# Patient Record
Sex: MALE | Race: WHITE | NOT HISPANIC OR LATINO | Employment: OTHER | URBAN - METROPOLITAN AREA
[De-identification: names, ages, dates, MRNs, and addresses within clinical notes are randomized per-mention and may not be internally consistent; named-entity substitution may affect disease eponyms.]

---

## 2023-04-12 LAB
ALANINE AMINOTRANSFERASE (SGPT) (U/L) IN SER/PLAS: 12 U/L (ref 10–52)
ALBUMIN (G/DL) IN SER/PLAS: 4.3 G/DL (ref 3.4–5)
ALKALINE PHOSPHATASE (U/L) IN SER/PLAS: 43 U/L (ref 33–136)
ANION GAP IN SER/PLAS: 12 MMOL/L (ref 10–20)
ASPARTATE AMINOTRANSFERASE (SGOT) (U/L) IN SER/PLAS: 13 U/L (ref 9–39)
BILIRUBIN TOTAL (MG/DL) IN SER/PLAS: 0.6 MG/DL (ref 0–1.2)
CALCIUM (MG/DL) IN SER/PLAS: 9.6 MG/DL (ref 8.6–10.3)
CARBON DIOXIDE, TOTAL (MMOL/L) IN SER/PLAS: 28 MMOL/L (ref 21–32)
CHLORIDE (MMOL/L) IN SER/PLAS: 104 MMOL/L (ref 98–107)
CHOLESTEROL (MG/DL) IN SER/PLAS: 156 MG/DL (ref 0–199)
CHOLESTEROL IN HDL (MG/DL) IN SER/PLAS: 43 MG/DL
CHOLESTEROL/HDL RATIO: 3.6
CREATININE (MG/DL) IN SER/PLAS: 0.83 MG/DL (ref 0.5–1.3)
GFR MALE: >90 ML/MIN/1.73M2
GLUCOSE (MG/DL) IN SER/PLAS: 115 MG/DL (ref 74–99)
LDL: 93 MG/DL (ref 0–99)
POTASSIUM (MMOL/L) IN SER/PLAS: 4.2 MMOL/L (ref 3.5–5.3)
PROTEIN TOTAL: 7.4 G/DL (ref 6.4–8.2)
SODIUM (MMOL/L) IN SER/PLAS: 140 MMOL/L (ref 136–145)
TRIGLYCERIDE (MG/DL) IN SER/PLAS: 98 MG/DL (ref 0–149)
UREA NITROGEN (MG/DL) IN SER/PLAS: 15 MG/DL (ref 6–23)
VLDL: 20 MG/DL (ref 0–40)

## 2023-09-18 PROBLEM — M17.9 KNEE OSTEOARTHRITIS: Status: ACTIVE | Noted: 2023-09-18

## 2023-09-18 PROBLEM — I49.3 PVC (PREMATURE VENTRICULAR CONTRACTION): Status: ACTIVE | Noted: 2023-09-18

## 2023-09-18 PROBLEM — E55.9 VITAMIN D DEFICIENCY: Status: ACTIVE | Noted: 2023-09-18

## 2023-09-18 PROBLEM — R68.82 DIMINISHED LIBIDO: Status: ACTIVE | Noted: 2023-09-18

## 2023-09-18 PROBLEM — N52.9 ERECTILE DYSFUNCTION: Status: ACTIVE | Noted: 2023-09-18

## 2023-09-18 PROBLEM — I34.0 MITRAL VALVE REGURGITATION: Status: ACTIVE | Noted: 2023-09-18

## 2023-09-18 PROBLEM — F41.9 ANXIETY: Status: ACTIVE | Noted: 2023-09-18

## 2023-09-18 PROBLEM — M19.90 OSTEOARTHRITIS: Status: ACTIVE | Noted: 2023-09-18

## 2023-09-18 PROBLEM — D23.10: Status: ACTIVE | Noted: 2023-09-18

## 2023-09-18 PROBLEM — R09.A2 GLOBUS PHARYNGEUS: Status: ACTIVE | Noted: 2023-09-18

## 2023-09-18 PROBLEM — I36.1 NONRHEUMATIC TRICUSPID VALVE REGURGITATION: Status: ACTIVE | Noted: 2023-09-18

## 2023-09-18 PROBLEM — K04.8: Status: ACTIVE | Noted: 2023-09-18

## 2023-09-18 PROBLEM — E66.9 OBESITY (BMI 30-39.9): Status: ACTIVE | Noted: 2023-09-18

## 2023-09-18 PROBLEM — K58.9 IRRITABLE BOWEL SYNDROME: Status: ACTIVE | Noted: 2023-09-18

## 2023-09-18 PROBLEM — H92.02 LEFT EAR PAIN: Status: ACTIVE | Noted: 2023-09-18

## 2023-09-18 PROBLEM — L25.9 CONTACT DERMATITIS: Status: ACTIVE | Noted: 2023-09-18

## 2023-09-18 PROBLEM — E29.1 HYPOGONADISM MALE: Status: ACTIVE | Noted: 2023-09-18

## 2023-09-18 PROBLEM — G47.30 SLEEP APNEA: Status: ACTIVE | Noted: 2023-09-18

## 2023-09-18 PROBLEM — H69.90 EUSTACHIAN TUBE DYSFUNCTION: Status: ACTIVE | Noted: 2023-09-18

## 2023-09-18 PROBLEM — E78.2 MIXED HYPERLIPIDEMIA: Status: ACTIVE | Noted: 2023-09-18

## 2023-09-18 PROBLEM — D49.2 SKIN NEOPLASM: Status: ACTIVE | Noted: 2023-09-18

## 2023-09-18 PROBLEM — L98.9 SKIN LESION: Status: ACTIVE | Noted: 2023-09-18

## 2023-09-18 PROBLEM — L29.9 ITCHING: Status: ACTIVE | Noted: 2023-09-18

## 2023-09-18 PROBLEM — L23.1 ALLERGIC CONTACT DERMATITIS DUE TO ADHESIVES: Status: ACTIVE | Noted: 2023-09-18

## 2023-09-18 PROBLEM — I10 ESSENTIAL HYPERTENSION: Status: ACTIVE | Noted: 2023-09-18

## 2023-09-18 PROBLEM — Z96.659 S/P TKR (TOTAL KNEE REPLACEMENT): Status: ACTIVE | Noted: 2023-09-18

## 2023-09-18 PROBLEM — M62.830 PARASPINAL MUSCLE SPASM: Status: ACTIVE | Noted: 2023-09-18

## 2023-09-18 PROBLEM — M18.10 ARTHRITIS OF CARPOMETACARPAL (CMC) JOINT OF THUMB: Status: ACTIVE | Noted: 2023-09-18

## 2023-09-18 PROBLEM — R42 DIZZINESS: Status: ACTIVE | Noted: 2023-09-18

## 2023-09-18 PROBLEM — M79.673 FOOT PAIN: Status: ACTIVE | Noted: 2023-09-18

## 2023-09-18 PROBLEM — I65.29 CAROTID ARTERY STENOSIS: Status: ACTIVE | Noted: 2023-09-18

## 2023-09-18 PROBLEM — I48.91 ATRIAL FIBRILLATION (MULTI): Status: ACTIVE | Noted: 2023-09-18

## 2023-09-18 RX ORDER — EPINEPHRINE 0.22MG
100 AEROSOL WITH ADAPTER (ML) INHALATION DAILY
COMMUNITY

## 2023-09-18 RX ORDER — ATENOLOL 25 MG/1
25 TABLET ORAL DAILY
COMMUNITY
Start: 2023-04-10

## 2023-09-18 RX ORDER — LATANOPROST 50 UG/ML
1 SOLUTION/ DROPS OPHTHALMIC
COMMUNITY

## 2023-09-18 RX ORDER — LOSARTAN POTASSIUM 100 MG/1
1 TABLET ORAL DAILY
COMMUNITY

## 2023-09-18 RX ORDER — ACETAMINOPHEN 500 MG
1 TABLET ORAL DAILY
COMMUNITY

## 2023-09-18 RX ORDER — OMEPRAZOLE 40 MG/1
40 CAPSULE, DELAYED RELEASE ORAL DAILY
COMMUNITY
End: 2023-12-11 | Stop reason: ALTCHOICE

## 2023-09-18 RX ORDER — CLOTRIMAZOLE AND BETAMETHASONE DIPROPIONATE 10; .64 MG/G; MG/G
1 CREAM TOPICAL SEE ADMIN INSTRUCTIONS
COMMUNITY

## 2023-09-18 RX ORDER — SILDENAFIL 100 MG/1
100 TABLET, FILM COATED ORAL AS NEEDED
COMMUNITY

## 2023-11-06 ENCOUNTER — OFFICE VISIT (OUTPATIENT)
Dept: CARDIOLOGY | Facility: CLINIC | Age: 73
End: 2023-11-06
Payer: MEDICARE

## 2023-11-06 VITALS
BODY MASS INDEX: 36.52 KG/M2 | DIASTOLIC BLOOD PRESSURE: 78 MMHG | SYSTOLIC BLOOD PRESSURE: 124 MMHG | HEART RATE: 60 BPM | WEIGHT: 269.6 LBS | HEIGHT: 72 IN

## 2023-11-06 DIAGNOSIS — I10 ESSENTIAL HYPERTENSION: ICD-10-CM

## 2023-11-06 DIAGNOSIS — I36.1 NONRHEUMATIC TRICUSPID VALVE REGURGITATION: ICD-10-CM

## 2023-11-06 DIAGNOSIS — I65.23 BILATERAL CAROTID ARTERY STENOSIS: ICD-10-CM

## 2023-11-06 DIAGNOSIS — I48.0 PAROXYSMAL ATRIAL FIBRILLATION (MULTI): ICD-10-CM

## 2023-11-06 DIAGNOSIS — Z87.891 FORMER SMOKER: ICD-10-CM

## 2023-11-06 DIAGNOSIS — I34.0 NONRHEUMATIC MITRAL VALVE REGURGITATION: ICD-10-CM

## 2023-11-06 DIAGNOSIS — E78.2 MIXED HYPERLIPIDEMIA: ICD-10-CM

## 2023-11-06 PROCEDURE — 1036F TOBACCO NON-USER: CPT | Performed by: INTERNAL MEDICINE

## 2023-11-06 PROCEDURE — 3078F DIAST BP <80 MM HG: CPT | Performed by: INTERNAL MEDICINE

## 2023-11-06 PROCEDURE — 99214 OFFICE O/P EST MOD 30 MIN: CPT | Performed by: INTERNAL MEDICINE

## 2023-11-06 PROCEDURE — 3008F BODY MASS INDEX DOCD: CPT | Performed by: INTERNAL MEDICINE

## 2023-11-06 PROCEDURE — 1159F MED LIST DOCD IN RCRD: CPT | Performed by: INTERNAL MEDICINE

## 2023-11-06 PROCEDURE — 3074F SYST BP LT 130 MM HG: CPT | Performed by: INTERNAL MEDICINE

## 2023-11-06 ASSESSMENT — ENCOUNTER SYMPTOMS
CONSTITUTIONAL NEGATIVE: 1
RESPIRATORY NEGATIVE: 1
CARDIOVASCULAR NEGATIVE: 1
NEUROLOGICAL NEGATIVE: 1

## 2023-11-06 NOTE — PROGRESS NOTES
CARDIOLOGY OFFICE VISIT      CHIEF COMPLAINT  Chief Complaint   Patient presents with    Follow-up     6 month        HISTORY OF PRESENT ILLNESS  Venancio Cervantes is a 73 y.o. year old male patient with a history of hypertension, paroxysmal A-fib s/p cardioversion in .  He has been doing well and had a repeat echo in 2023 that shows low normal LV function with EF of 50% and mild mitral and tricuspid regurgitation.  He denies any chest pain or shortness of breath with his day-to-day activities.  He has not had any recurrent A-fib since then.  Labs from 2023 shows cholesterol is 156, HDL is 43, LDL is 93 and triglyceride is 98    ASSESSMENT AND PLAN  1.  Paroxysmal A-fib: He continues to maintain sinus rhythm on his current medications, will continue Eliquis for anticoagulation.  2.  Hypertension: Blood pressures well controlled on current medications which we will continue.  3.  Overweight: Is encouraged to lose weight at least 10 pounds prior to his next follow-up.    Problem List Items Addressed This Visit       Essential hypertension    Carotid artery stenosis    Atrial fibrillation (CMS/HCC)    Nonrheumatic tricuspid valve regurgitation    Mitral valve regurgitation    Mixed hyperlipidemia    BMI 36.0-36.9,adult    Former smoker       Recent Cardiovascular Testing:    Echo-  Stress-  Cath-  Carotid Ultrasound-    Past Medical History  Past Medical History:   Diagnosis Date    Body mass index (BMI) 37.0-37.9, adult     BMI 37.0-37.9, adult    Rash and other nonspecific skin eruption 2021    Rash       Social History  Social History     Tobacco Use    Smoking status: Former     Packs/day: 1.00     Years: 15.00     Additional pack years: 0.00     Total pack years: 15.00     Types: Cigarettes     Quit date:      Years since quittin.8    Smokeless tobacco: Never   Substance Use Topics    Alcohol use: Yes     Comment: 1x weekly    Drug use: Never       Family History     Family History  "  Problem Relation Name Age of Onset    Irregular heart beat Mother      Other (arteriosclerotic cardiovascular disease) Sister      Lung cancer Sister      Colon cancer Brother          Allergies:  Allergies   Allergen Reactions    Doxycycline Other    Metoprolol Unknown    Silver Sulfadiazine Rash        Outpatient Medications:  Current Outpatient Medications   Medication Instructions    apixaban (ELIQUIS) 5 mg, oral, 2 times daily    atenolol (TENORMIN) 25 mg, oral, Daily    cholecalciferol (Vitamin D-3) 5,000 Units tablet 1 tablet, oral, Daily    clotrimazole-betamethasone (Lotrisone) cream 1 Application, Topical, See admin instructions, Apply as directed by physician    coenzyme Q-10 100 mg, oral, Daily    latanoprost (Xalatan) 0.005 % ophthalmic solution 1 drop, ophthalmic (eye), Daily RT    losartan (Cozaar) 100 mg tablet 1 tablet, oral, Daily    omeprazole (PRILOSEC) 40 mg, oral, Daily    sildenafil (VIAGRA) 100 mg, oral, As needed        Recent Lab Results:    CBC:   No results found for: \"WBC\", \"RBC\", \"HGB\", \"HCT\", \"PLT\"     CMP:    Lab Results   Component Value Date     04/12/2023    K 4.2 04/12/2023     04/12/2023    CO2 28 04/12/2023    BUN 15 04/12/2023    CREATININE 0.83 04/12/2023    GLUCOSE 115 (H) 04/12/2023    CALCIUM 9.6 04/12/2023       Magnesium:    No results found for: \"MG\"    Lipid Profile:    Lab Results   Component Value Date    TRIG 98 04/12/2023    HDL 43.0 04/12/2023       TSH:    Lab Results   Component Value Date    TSH 1.83 07/03/2019       BNP:   No results found for: \"BNP\"     PT/INR:    No results found for: \"PROTIME\", \"INR\"    HgBA1c:    Lab Results   Component Value Date    HGBA1C 5.5 07/03/2019       BMP:  Lab Results   Component Value Date     04/12/2023     07/03/2019    K 4.2 04/12/2023    K 4.5 07/03/2019     04/12/2023     07/03/2019    CO2 28 04/12/2023    CO2 27 07/03/2019    BUN 15 04/12/2023    BUN 16 07/03/2019    CREATININE 0.83 " "04/12/2023    CREATININE 0.86 07/03/2019       CBC:  No results found for: \"WBC\", \"RBC\", \"HGB\", \"HCT\", \"MCV\", \"MCH\", \"MCHC\", \"RDW\", \"PLT\", \"MPV\"    Cardiac Enzymes:    No results found for: \"TROPHS\"    Hepatic Function Panel:    Lab Results   Component Value Date    ALKPHOS 43 04/12/2023    ALT 12 04/12/2023    AST 13 04/12/2023    PROT 7.4 04/12/2023    BILITOT 0.6 04/12/2023         REVIEW OF SYSTEMS  Review of Systems   Constitutional: Negative.   Cardiovascular: Negative.    Respiratory: Negative.     Neurological: Negative.    All other systems reviewed and are negative.      VITALS  Vitals:    11/06/23 0921   BP: 124/78   Pulse: 60     Wt Readings from Last 4 Encounters:   11/06/23 122 kg (269 lb 9.6 oz)   05/03/23 125 kg (275 lb)   04/10/23 124 kg (273 lb 4 oz)   12/14/21 117 kg (259 lb)       PHYSICAL EXAM  Constitutional:       Appearance: Healthy appearance.   Eyes:      Pupils: Pupils are equal, round, and reactive to light.   Pulmonary:      Effort: Pulmonary effort is normal.      Breath sounds: Normal breath sounds.   Cardiovascular:      PMI at left midclavicular line. Normal rate. Regular rhythm.      Murmurs: There is no murmur.      No gallop.  No click. No rub.   Pulses:     Intact distal pulses.   Musculoskeletal: Normal range of motion.      Cervical back: Normal range of motion. Skin:     General: Skin is warm and dry.   Neurological:      General: No focal deficit present.      Mental Status: Alert and oriented to person, place and time.             "

## 2023-12-11 ENCOUNTER — OFFICE VISIT (OUTPATIENT)
Dept: PRIMARY CARE | Facility: CLINIC | Age: 73
End: 2023-12-11
Payer: MEDICARE

## 2023-12-11 VITALS
DIASTOLIC BLOOD PRESSURE: 82 MMHG | TEMPERATURE: 97.9 F | WEIGHT: 271 LBS | SYSTOLIC BLOOD PRESSURE: 126 MMHG | HEART RATE: 67 BPM | BODY MASS INDEX: 34.78 KG/M2 | HEIGHT: 74 IN

## 2023-12-11 DIAGNOSIS — R05.1 ACUTE COUGH: ICD-10-CM

## 2023-12-11 DIAGNOSIS — J01.00 ACUTE NON-RECURRENT MAXILLARY SINUSITIS: Primary | ICD-10-CM

## 2023-12-11 DIAGNOSIS — J02.9 SORE THROAT: ICD-10-CM

## 2023-12-11 DIAGNOSIS — I48.0 PAF (PAROXYSMAL ATRIAL FIBRILLATION) (MULTI): ICD-10-CM

## 2023-12-11 PROCEDURE — 1126F AMNT PAIN NOTED NONE PRSNT: CPT | Performed by: INTERNAL MEDICINE

## 2023-12-11 PROCEDURE — 3074F SYST BP LT 130 MM HG: CPT | Performed by: INTERNAL MEDICINE

## 2023-12-11 PROCEDURE — 3008F BODY MASS INDEX DOCD: CPT | Performed by: INTERNAL MEDICINE

## 2023-12-11 PROCEDURE — 1159F MED LIST DOCD IN RCRD: CPT | Performed by: INTERNAL MEDICINE

## 2023-12-11 PROCEDURE — 3079F DIAST BP 80-89 MM HG: CPT | Performed by: INTERNAL MEDICINE

## 2023-12-11 PROCEDURE — 99213 OFFICE O/P EST LOW 20 MIN: CPT | Performed by: INTERNAL MEDICINE

## 2023-12-11 PROCEDURE — 1036F TOBACCO NON-USER: CPT | Performed by: INTERNAL MEDICINE

## 2023-12-11 RX ORDER — AMOXICILLIN 500 MG/1
500 CAPSULE ORAL 3 TIMES DAILY
Qty: 30 CAPSULE | Refills: 0 | Status: SHIPPED | OUTPATIENT
Start: 2023-12-11 | End: 2023-12-21

## 2023-12-11 ASSESSMENT — PAIN SCALES - GENERAL: PAINLEVEL: 0-NO PAIN

## 2023-12-11 NOTE — PROGRESS NOTES
Patient is seen my office today with chief complaint of cough congestion nasal drainage and pressure in the facial area for about a week.  He is worried about a streptococcal infection because one of the family members had this infection recently home patient was babysitting.  He has no fever or chills.  Denies any shortness of breath or wheezing.  Has no chest pain or palpitation.  Patient has a history of paroxysmal atrial fibrillation.  He is anticoagulated.  Review of other systems are negative.    On examination:  General examination: No acute discomfort  Eyes: There is no pallor or jaundice  Nose: Nasal mucosa is congested  Oral cavity throat: Pharyngeal wall congestion is present.  Postnasal discharge is present.  Neck: There is no lymphadenopathy or JVD  Lungs: Clear to auscultation  CVS: Heart sounds are regular.  There is no murmur    Assessment and plan  1.  Acute maxillary sinusitis: I am going to prescribe amoxicillin.  2.  Cough: Advised to take cough medications as needed  3.  Contact with a Streptococcus sore throat patient.  The office test for respiratory course is negative  4.  History of paroxysmal atrial fibrillation: Patient is on apixaban.  Interaction with antibiotics is checked.  Patient is advised to call our office if he is not feeling better after 2 or 3 days.

## 2024-01-04 ENCOUNTER — TELEPHONE (OUTPATIENT)
Dept: PRIMARY CARE | Facility: CLINIC | Age: 74
End: 2024-01-04
Payer: MEDICARE

## 2024-01-04 DIAGNOSIS — Z12.11 ENCOUNTER FOR SCREENING COLONOSCOPY: ICD-10-CM

## 2024-08-15 ENCOUNTER — OFFICE VISIT (OUTPATIENT)
Dept: CARDIOLOGY | Facility: CLINIC | Age: 74
End: 2024-08-15
Payer: COMMERCIAL

## 2024-08-15 ENCOUNTER — LAB (OUTPATIENT)
Dept: LAB | Facility: LAB | Age: 74
End: 2024-08-15
Payer: MEDICARE

## 2024-08-15 VITALS
DIASTOLIC BLOOD PRESSURE: 70 MMHG | BODY MASS INDEX: 34.39 KG/M2 | WEIGHT: 268 LBS | SYSTOLIC BLOOD PRESSURE: 116 MMHG | HEART RATE: 99 BPM | HEIGHT: 74 IN

## 2024-08-15 DIAGNOSIS — Z87.891 FORMER SMOKER: ICD-10-CM

## 2024-08-15 DIAGNOSIS — I10 ESSENTIAL HYPERTENSION: ICD-10-CM

## 2024-08-15 DIAGNOSIS — I49.3 PVC (PREMATURE VENTRICULAR CONTRACTION): ICD-10-CM

## 2024-08-15 DIAGNOSIS — E66.9 OBESITY (BMI 30-39.9): ICD-10-CM

## 2024-08-15 DIAGNOSIS — I36.1 NONRHEUMATIC TRICUSPID VALVE REGURGITATION: ICD-10-CM

## 2024-08-15 DIAGNOSIS — I48.0 PAROXYSMAL ATRIAL FIBRILLATION (MULTI): ICD-10-CM

## 2024-08-15 DIAGNOSIS — E78.2 MIXED HYPERLIPIDEMIA: ICD-10-CM

## 2024-08-15 DIAGNOSIS — I34.0 MITRAL VALVE INSUFFICIENCY, UNSPECIFIED ETIOLOGY: ICD-10-CM

## 2024-08-15 LAB
ANION GAP SERPL CALC-SCNC: 10 MMOL/L (ref 10–20)
BUN SERPL-MCNC: 18 MG/DL (ref 6–23)
CALCIUM SERPL-MCNC: 9.8 MG/DL (ref 8.6–10.3)
CHLORIDE SERPL-SCNC: 103 MMOL/L (ref 98–107)
CO2 SERPL-SCNC: 31 MMOL/L (ref 21–32)
CREAT SERPL-MCNC: 1.09 MG/DL (ref 0.5–1.3)
EGFRCR SERPLBLD CKD-EPI 2021: 71 ML/MIN/1.73M*2
GLUCOSE SERPL-MCNC: 107 MG/DL (ref 74–99)
POTASSIUM SERPL-SCNC: 4.3 MMOL/L (ref 3.5–5.3)
SODIUM SERPL-SCNC: 140 MMOL/L (ref 136–145)

## 2024-08-15 PROCEDURE — 3074F SYST BP LT 130 MM HG: CPT | Performed by: INTERNAL MEDICINE

## 2024-08-15 PROCEDURE — 3078F DIAST BP <80 MM HG: CPT | Performed by: INTERNAL MEDICINE

## 2024-08-15 PROCEDURE — 1036F TOBACCO NON-USER: CPT | Performed by: INTERNAL MEDICINE

## 2024-08-15 PROCEDURE — 80048 BASIC METABOLIC PNL TOTAL CA: CPT

## 2024-08-15 PROCEDURE — 1159F MED LIST DOCD IN RCRD: CPT | Performed by: INTERNAL MEDICINE

## 2024-08-15 PROCEDURE — 99214 OFFICE O/P EST MOD 30 MIN: CPT | Performed by: INTERNAL MEDICINE

## 2024-08-15 PROCEDURE — 93000 ELECTROCARDIOGRAM COMPLETE: CPT | Performed by: INTERNAL MEDICINE

## 2024-08-15 RX ORDER — ATENOLOL 25 MG/1
50 TABLET ORAL DAILY
Start: 2024-08-15 | End: 2025-08-15

## 2024-08-15 RX ORDER — LANOLIN ALCOHOL/MO/W.PET/CERES
400 CREAM (GRAM) TOPICAL DAILY
Start: 2024-08-15 | End: 2025-08-15

## 2024-08-15 NOTE — PROGRESS NOTES
CARDIOLOGY OFFICE VISIT      CHIEF COMPLAINT  Atrial fibrillation    HISTORY OF PRESENT ILLNESS  The patient states that he about a week ago he went into atrial fibrillation.  He does have [employer] mobile device and checked it and he was in atrial fibrillation.  He has noticed more fatigue and dyspnea activities.  He had a funny feeling in his throat at times.  He denies chest discomfort or symptoms of myocardial ischemia.  He denies presyncope and syncope.  He states she was on vacation last week and forgot his medicines for 1 day.  I told him I would recommend that he undergo RAKAN guided cardioversion since he has been off his Eliquis for 24 hours recently.  He understands and is agreeable.    EKG: Atrial fibrillation with a rapid ventricular spots, nonspecific ST-T changes, results discussed with patient    Impression:  Paroxysmal atrial fibrillation  Hypertension  Mitral regurgitation  Tricuspid regurgitation  Former smoker  Obstructive sleep apnea being managed with CPAP  Obesity    Plan:  Start magnesium oxide 400 mg a day with food, increase atenolol to 50 mg a day  RAKAN guided cardioversion    Please excuse any errors in grammar or translation related to this dictation.  Voice recognition software was utilized to prepare this document.    Past Medical History      Social History  Social History     Tobacco Use    Smoking status: Former     Current packs/day: 0.00     Average packs/day: 1 pack/day for 15.0 years (15.0 ttl pk-yrs)     Types: Cigarettes     Start date:      Quit date:      Years since quittin.6     Passive exposure: Past    Smokeless tobacco: Never   Substance Use Topics    Alcohol use: Not Currently     Comment: 1x weekly    Drug use: Never       Family History     Family History   Problem Relation Name Age of Onset    Irregular heart beat Mother      Other (arteriosclerotic cardiovascular disease) Sister      Lung cancer Sister      Colon cancer Brother          Allergies:  Allergies    Allergen Reactions    Doxycycline Other    Metoprolol Unknown    Silver Sulfadiazine Rash        Outpatient Medications:  Current Outpatient Medications   Medication Instructions    apixaban (ELIQUIS) 5 mg, oral, 2 times daily    atenolol (TENORMIN) 25 mg, oral, Daily    clotrimazole-betamethasone (Lotrisone) cream 1 Application, Topical, See admin instructions, Apply as directed by physician    coenzyme Q-10 100 mg, oral, Daily    latanoprost (Xalatan) 0.005 % ophthalmic solution 1 drop, ophthalmic (eye), Daily RT    losartan (Cozaar) 100 mg tablet 1 tablet, oral, Daily    sildenafil (VIAGRA) 100 mg, oral, As needed          REVIEW OF SYSTEMS  Review of Systems   All other systems reviewed and are negative.        VITALS  Vitals:    08/15/24 1301   BP: 116/70   Pulse: 99       PHYSICAL EXAM  Constitutional:       Appearance: Healthy appearance. Not in distress.   Eyes:      Conjunctiva/sclera: Conjunctivae normal.      Pupils: Pupils are equal, round, and reactive to light.   Neck:      Vascular: No JVR. JVD normal.   Pulmonary:      Effort: Pulmonary effort is normal.      Breath sounds: Normal breath sounds. No wheezing. No rhonchi. No rales.   Chest:      Chest wall: Not tender to palpatation.   Cardiovascular:      PMI at left midclavicular line. Normal rate. Irregularly irregular rhythm. Normal S1. Normal S2.       Murmurs: There is no murmur.      No gallop.  No click. No rub.   Pulses:     Intact distal pulses.   Edema:     Peripheral edema absent.   Abdominal:      Tenderness: There is no abdominal tenderness.   Musculoskeletal: Normal range of motion.         General: No tenderness.      Cervical back: Normal range of motion. Skin:     General: Skin is warm and dry.   Neurological:      General: No focal deficit present.      Mental Status: Alert and oriented to person, place and time.           ASSESSMENT AND PLAN  Diagnoses and all orders for this visit:  Paroxysmal atrial fibrillation  (Multi)  Essential hypertension  Mixed hyperlipidemia  Mitral valve insufficiency, unspecified etiology  Nonrheumatic tricuspid valve regurgitation  PVC (premature ventricular contraction)  Obesity (BMI 30-39.9)  Former smoker      [unfilled]

## 2024-08-15 NOTE — PATIENT INSTRUCTIONS
Increase your Atenolol to 2 tablets once a day  START Mag-ox  400 mg once a day  You will be scheduled for a RAKAN Cardioversion with Dr. Santos    Continue same medications/treatment.  Patient educated on proper medication use.  Patient educated on risk factor modification.  Please bring any lab results from other providers / physicians to your next appointment.    Please bring all medicines, vitamins and herbal supplements with you when you come to the office.    Prescriptions will not be filled unless you are compliant with your follow up appointments or have a follow up  appointment scheduled as per instruction of your physician.  Refills should be requested at the time of  Your visit.    Audrey HADLEY LPN, am scribing for and in the presence of Dr. Inocencio Mratinez MD, FACC

## 2024-08-16 ENCOUNTER — TELEPHONE (OUTPATIENT)
Dept: CARDIOLOGY | Facility: CLINIC | Age: 74
End: 2024-08-16
Payer: MEDICARE

## 2024-08-16 NOTE — TELEPHONE ENCOUNTER
Pt verbalized understanding of the results that were read and would like a copy of labs when he is seen again for [facility]

## 2024-08-16 NOTE — TELEPHONE ENCOUNTER
----- Message from Nurse Audrey PADRON sent at 8/16/2024  9:49 AM EDT -----    ----- Message -----  From: Inocencio Martinez MD  Sent: 8/16/2024   7:55 AM EDT  To: Audrey Bowman LPN    Renal profile okay  ----- Message -----  From: Lab, Background User  Sent: 8/15/2024   5:12 PM EDT  To: Inocencio Martinez MD

## 2024-08-19 DIAGNOSIS — I10 ESSENTIAL HYPERTENSION: ICD-10-CM

## 2024-08-19 NOTE — TELEPHONE ENCOUNTER
Patient called and adv that he went to his primary care today and his blood pressure was 90/60 and his primary care suggested to contact Dr. FERNANDEZ about it. Patient wants to know if his Losartan should be cut in half since his blood pressure is 90/60 or should it remain the same? I advised pt that a nurse will give him a call for further instructions.

## 2024-08-20 RX ORDER — LOSARTAN POTASSIUM 100 MG/1
50 TABLET ORAL DAILY
Start: 2024-08-20 | End: 2025-08-20

## 2024-08-20 NOTE — TELEPHONE ENCOUNTER
Per Dr. Inocencio Martinez , have patient decrease his Losartan to 50 mg daily, may cut current 100 mg tablet in half and take 1/2 tablet daily.  Call placed to patient and advised.  Patient verbalized understanding.

## 2024-08-22 ENCOUNTER — HOSPITAL ENCOUNTER (OUTPATIENT)
Dept: CARDIOLOGY | Facility: HOSPITAL | Age: 74
Discharge: HOME | End: 2024-08-22
Payer: COMMERCIAL

## 2024-08-22 ENCOUNTER — ANESTHESIA (OUTPATIENT)
Dept: CARDIOLOGY | Facility: HOSPITAL | Age: 74
End: 2024-08-22
Payer: COMMERCIAL

## 2024-08-22 ENCOUNTER — ANESTHESIA EVENT (OUTPATIENT)
Dept: CARDIOLOGY | Facility: HOSPITAL | Age: 74
End: 2024-08-22
Payer: COMMERCIAL

## 2024-08-22 DIAGNOSIS — I48.0 PAROXYSMAL ATRIAL FIBRILLATION (MULTI): ICD-10-CM

## 2024-08-22 DIAGNOSIS — I48.19 OTHER PERSISTENT ATRIAL FIBRILLATION (MULTI): ICD-10-CM

## 2024-08-22 LAB
APTT PPP: 34 SECONDS (ref 27–38)
ATRIAL RATE: 133 BPM
ATRIAL RATE: 375 BPM
EJECTION FRACTION: 60 %
ERYTHROCYTE [DISTWIDTH] IN BLOOD BY AUTOMATED COUNT: 13.2 % (ref 11.5–14.5)
HCT VFR BLD AUTO: 49.7 % (ref 41–52)
HGB BLD-MCNC: 16.9 G/DL (ref 13.5–17.5)
INR PPP: 1.5 (ref 0.9–1.1)
MCH RBC QN AUTO: 30.7 PG (ref 26–34)
MCHC RBC AUTO-ENTMCNC: 34 G/DL (ref 32–36)
MCV RBC AUTO: 90 FL (ref 80–100)
NRBC BLD-RTO: 0 /100 WBCS (ref 0–0)
PLATELET # BLD AUTO: 248 X10*3/UL (ref 150–450)
PROTHROMBIN TIME: 16.4 SECONDS (ref 9.8–12.8)
Q ONSET: 219 MS
Q ONSET: 221 MS
QRS COUNT: 13 BEATS
QRS COUNT: 14 BEATS
QRS DURATION: 102 MS
QRS DURATION: 94 MS
QT INTERVAL: 354 MS
QT INTERVAL: 372 MS
QTC CALCULATION(BAZETT): 425 MS
QTC CALCULATION(BAZETT): 432 MS
QTC FREDERICIA: 400 MS
QTC FREDERICIA: 411 MS
R AXIS: 1 DEGREES
R AXIS: 6 DEGREES
RBC # BLD AUTO: 5.5 X10*6/UL (ref 4.5–5.9)
RIGHT VENTRICLE PEAK SYSTOLIC PRESSURE: 18.1 MMHG
T AXIS: 1 DEGREES
T AXIS: 15 DEGREES
T OFFSET: 396 MS
T OFFSET: 407 MS
VENTRICULAR RATE: 81 BPM
VENTRICULAR RATE: 87 BPM
WBC # BLD AUTO: 8 X10*3/UL (ref 4.4–11.3)

## 2024-08-22 PROCEDURE — 85027 COMPLETE CBC AUTOMATED: CPT | Performed by: NURSE PRACTITIONER

## 2024-08-22 PROCEDURE — 7100000009 HC PHASE TWO TIME - INITIAL BASE CHARGE

## 2024-08-22 PROCEDURE — 2500000004 HC RX 250 GENERAL PHARMACY W/ HCPCS (ALT 636 FOR OP/ED): Performed by: NURSE PRACTITIONER

## 2024-08-22 PROCEDURE — 92960 CARDIOVERSION ELECTRIC EXT: CPT | Performed by: INTERNAL MEDICINE

## 2024-08-22 PROCEDURE — 3700000001 HC GENERAL ANESTHESIA TIME - INITIAL BASE CHARGE

## 2024-08-22 PROCEDURE — 2500000004 HC RX 250 GENERAL PHARMACY W/ HCPCS (ALT 636 FOR OP/ED): Performed by: NURSE ANESTHETIST, CERTIFIED REGISTERED

## 2024-08-22 PROCEDURE — 93005 ELECTROCARDIOGRAM TRACING: CPT

## 2024-08-22 PROCEDURE — 93312 ECHO TRANSESOPHAGEAL: CPT | Performed by: INTERNAL MEDICINE

## 2024-08-22 PROCEDURE — 7100000010 HC PHASE TWO TIME - EACH INCREMENTAL 1 MINUTE

## 2024-08-22 PROCEDURE — 2500000005 HC RX 250 GENERAL PHARMACY W/O HCPCS: Performed by: INTERNAL MEDICINE

## 2024-08-22 PROCEDURE — 3700000002 HC GENERAL ANESTHESIA TIME - EACH INCREMENTAL 1 MINUTE

## 2024-08-22 PROCEDURE — 85610 PROTHROMBIN TIME: CPT | Performed by: NURSE PRACTITIONER

## 2024-08-22 PROCEDURE — 99152 MOD SED SAME PHYS/QHP 5/>YRS: CPT

## 2024-08-22 PROCEDURE — 99153 MOD SED SAME PHYS/QHP EA: CPT

## 2024-08-22 PROCEDURE — 36415 COLL VENOUS BLD VENIPUNCTURE: CPT | Performed by: NURSE PRACTITIONER

## 2024-08-22 PROCEDURE — 99153 MOD SED SAME PHYS/QHP EA: CPT | Performed by: INTERNAL MEDICINE

## 2024-08-22 PROCEDURE — 99152 MOD SED SAME PHYS/QHP 5/>YRS: CPT | Performed by: INTERNAL MEDICINE

## 2024-08-22 PROCEDURE — 2500000004 HC RX 250 GENERAL PHARMACY W/ HCPCS (ALT 636 FOR OP/ED): Performed by: INTERNAL MEDICINE

## 2024-08-22 PROCEDURE — 93325 DOPPLER ECHO COLOR FLOW MAPG: CPT | Performed by: INTERNAL MEDICINE

## 2024-08-22 PROCEDURE — 93320 DOPPLER ECHO COMPLETE: CPT | Performed by: INTERNAL MEDICINE

## 2024-08-22 PROCEDURE — 93320 DOPPLER ECHO COMPLETE: CPT

## 2024-08-22 RX ORDER — FENTANYL CITRATE 50 UG/ML
INJECTION, SOLUTION INTRAMUSCULAR; INTRAVENOUS AS NEEDED
Status: DISCONTINUED | OUTPATIENT
Start: 2024-08-22 | End: 2024-08-22 | Stop reason: HOSPADM

## 2024-08-22 RX ORDER — SODIUM CHLORIDE 9 MG/ML
20 INJECTION, SOLUTION INTRAVENOUS CONTINUOUS
Status: DISCONTINUED | OUTPATIENT
Start: 2024-08-22 | End: 2024-08-22

## 2024-08-22 RX ORDER — MIDAZOLAM HYDROCHLORIDE 1 MG/ML
INJECTION, SOLUTION INTRAMUSCULAR; INTRAVENOUS AS NEEDED
Status: DISCONTINUED | OUTPATIENT
Start: 2024-08-22 | End: 2024-08-22 | Stop reason: HOSPADM

## 2024-08-22 RX ORDER — LIDOCAINE HYDROCHLORIDE 20 MG/ML
JELLY TOPICAL AS NEEDED
Status: DISCONTINUED | OUTPATIENT
Start: 2024-08-22 | End: 2024-08-22 | Stop reason: HOSPADM

## 2024-08-22 RX ORDER — PROPOFOL 10 MG/ML
INJECTION, EMULSION INTRAVENOUS AS NEEDED
Status: DISCONTINUED | OUTPATIENT
Start: 2024-08-22 | End: 2024-08-22

## 2024-08-22 ASSESSMENT — COLUMBIA-SUICIDE SEVERITY RATING SCALE - C-SSRS
6. HAVE YOU EVER DONE ANYTHING, STARTED TO DO ANYTHING, OR PREPARED TO DO ANYTHING TO END YOUR LIFE?: NO
1. IN THE PAST MONTH, HAVE YOU WISHED YOU WERE DEAD OR WISHED YOU COULD GO TO SLEEP AND NOT WAKE UP?: NO
2. HAVE YOU ACTUALLY HAD ANY THOUGHTS OF KILLING YOURSELF?: NO

## 2024-08-22 ASSESSMENT — PAIN SCALES - GENERAL
PAINLEVEL_OUTOF10: 0 - NO PAIN

## 2024-08-22 ASSESSMENT — PAIN - FUNCTIONAL ASSESSMENT
PAIN_FUNCTIONAL_ASSESSMENT: 0-10

## 2024-08-22 NOTE — SIGNIFICANT EVENT
Upon arrival to room focused assessment performed and WDL. Pt talking with RN, denies dizziness/lightheadedness/pain. Educated Pt on current restrictions and that he cannot eat or drink unit bedside swallow evaluation has been performed and gag reflex has returned. He states understanding. Placed Pt on 3L O2 via NC, wife at bedside.

## 2024-08-22 NOTE — PRE-PROCEDURE ASSESSMENT
[facility]-NCDR CathPCI V5 Collection Form    Pre-Procedure Information  - Electrocardiac assessment method: telemetry monitor     - The assessment result was abnormal.      - Abnormal assessment findings include: new onset atrial fibrillation    Indications and Presentation  - Indication(s) for cath lab visit: cardiac arrhythmia    - Ventricular support was not required.           74-year-old developed atrial fibrillation earlier this month based on home Kardia device.  No symptoms of CHF or angina.  Past history hypertension, mitral insufficiency, sleep apnea on CPAP.  Minimal palpitations primary symptom fatigue and dyspnea.  In light of symptomatic and recent onset initial approach RAKAN cardioversion.

## 2024-08-22 NOTE — SIGNIFICANT EVENT
"Discharge instructions given via \"teach back: method. Covered: Post RAKAN/DCC discharge instructions/education, medications/when to resume them, and follow up appointments. Pt and family state understanding and answer follow up questions correctly. Ready to discharge home via wheelchair.  "

## 2024-08-22 NOTE — ANESTHESIA POSTPROCEDURE EVALUATION
Patient: Venancio Cervantes    Procedure Summary       Date: 08/22/24 [facility]    Anesthesia Start: 1018 Anesthesia Stop: 1026    Procedure: TRANSESOPHAGEAL ECHO (RAKAN) W/ POSSIBLE CARDIOVERSION Diagnosis:       Paroxysmal atrial fibrillation (Multi)      (paroxysmal atrial fibrillation)    Scheduled Providers: Gallo Arvizu MD Responsible Provider: Leighann Styles MD    Anesthesia Type: MAC ASA Status: 3            Anesthesia Type: MAC    Anesthesia Post Evaluation    Patient location during evaluation: bedside  Patient participation: complete - patient participated  Level of consciousness: sleepy but conscious  Pain management: adequate  Airway patency: patent  Cardiovascular status: acceptable  Respiratory status: acceptable  Hydration status: acceptable  Postoperative Nausea and Vomiting: none      No notable events documented.

## 2024-08-22 NOTE — PRE-SEDATION DOCUMENTATION
Sedation Plan    ASA 3     Mallampati class: I.    Risks, benefits, and alternatives discussed with patient.    Procedure to be RAKAN/cardioversion for symptomatic atrial fibrillation

## 2024-08-22 NOTE — ANESTHESIA PREPROCEDURE EVALUATION
Patient: Venancio Cervantes    Procedure Information       Date/Time: 08/22/24 1000    Scheduled providers: Gallo Arvizu MD    Procedure: TRANSESOPHAGEAL ECHO (RAKAN) W/ POSSIBLE CARDIOVERSION    [facility]            Relevant Problems   Cardiac   (+) Atrial fibrillation (Multi)   (+) Essential hypertension   (+) Mitral valve regurgitation   (+) Mixed hyperlipidemia   (+) Nonrheumatic tricuspid valve regurgitation   (+) PVC (premature ventricular contraction)      Neuro   (+) Anxiety   (+) Carotid artery stenosis      GI   (+) Irritable bowel syndrome      Musculoskeletal   (+) Knee osteoarthritis   (+) Osteoarthritis       Clinical information reviewed:   Tobacco  Allergies  Meds   Med Hx  Surg Hx   Fam Hx  Soc Hx        NPO Detail:  NPO/Void Status  Date of Last Liquid: 08/22/24  Time of Last Liquid: 0700  Date of Last Solid: 08/21/24  Time of Last Solid: 1800  Last Intake Type: Clear fluids  Time of Last Void: 0842         Physical Exam    Airway  Mallampati: II  TM distance: >3 FB  Neck ROM: full     Cardiovascular    Dental    Pulmonary    Abdominal        Anesthesia Plan    History of general anesthesia?: yes  History of complications of general anesthesia?: no    ASA 3     MAC     intravenous induction   Anesthetic plan and risks discussed with patient.

## 2024-08-22 NOTE — PRE-PROCEDURE ASSESSMENT
[facility]-NCDR CathPCI V5 Collection Form    Pre-Procedure Information  - Electrocardiac assessment method: telemetry monitor     - The assessment result was abnormal.      - No new antiarrhythmic therapy was received prior to evaluation within the cath lab.      - Abnormal assessment findings include: new onset atrial fibrillation    Indications and Presentation  - Ventricular support was not required.     Patient without history of coronary artery disease or CHF presents with new onset symptomatic atrial fibrillation with fatigue and tiredness no shortness of breath or chest tightness.    Discussed risk and benefits of RAKAN/cardioversion including potential cardioversion not being successful or atrial fibrillation recurring soon thereafter or underlying bradycardia requiring pacemaker support.  He understands risks and agrees to proceed

## 2024-08-22 NOTE — POST-PROCEDURE NOTE
Physician Transition of Care Summary  Invasive Cardiovascular Lab    Procedure Date: 8/22/2024  Attending: Gallo Arvizu MD  Resident/Fellow/Other Assistant: None    Indications:   Symptomatic atrial fibrillation    Post-procedure diagnosis:   Normal transesophageal echo except for spontaneous echo contrast  Successful direct-current cardioversion to sinus rhythm.    Procedure(s):   Transesophageal echo    Procedure Findings:   RAKAN: Normal cardiac chamber size except borderline left atrial enlargement          Normal LV/RV systolic function          Normal valvular structure and function with 1-2+ MR likely related to atrial fibrillation          No intracavitary thrombi masses or vegetation with left atrial appendage appearing normal          No pericardial effusion          No PFO    Direct-current cardioversion: Successful conversion to sinus rhythm    Description of the Procedure:   Patient to fasting state.  Cetacaine topical spray and viscous lidocaine utilized anesthetize posterior pharynx.  For the RAKAN 100 mcg of fentanyl administered and 4 mg of Versed with mild sedation achieved only.  Patient tolerated RAKAN well.  Probe was advanced and posterior pharynx without complication.  Multiple views of myocardium obtained spectral and color Doppler analysis performed.  Probe was withdrawn as aorta was visualized patient tolerated procedure without complication    Anesthesia presented for administration of propofol and monitoring respiratory state.  Direct-current was applied in an AP fashion utilizing 200 J of direct-current.  Sinus rhythm resulted.  Patient awoke very promptly from anesthesia without difficulty no hypoxia    Complications:   None    Anticoagulation/Antiplatelet Plan:   Continue Eliquis    Estimated Blood Loss:   0 mL    Anesthesia: Moderate for RAKAN, anesthesia staff administered propofol for direct-current cardioversion anesthesia Staff: Anesthesia staff administered propofol see Cath Lab  documentation    Disposition:   Home  Follow-up Dr. Martinez as an outpatient continue current medical regimen      Electronically signed by: Gallo Arvizu MD, 8/22/2024 10:22 AM

## 2024-08-22 NOTE — SIGNIFICANT EVENT
Bedside swallow evaluation performed and WDL. Gag reflex present. Pt drinking water and given turkey sandwich box to be taken home.

## 2024-08-23 VITALS
HEART RATE: 78 BPM | WEIGHT: 267.2 LBS | TEMPERATURE: 97.5 F | BODY MASS INDEX: 34.29 KG/M2 | HEIGHT: 74 IN | OXYGEN SATURATION: 98 % | RESPIRATION RATE: 10 BRPM | SYSTOLIC BLOOD PRESSURE: 134 MMHG | DIASTOLIC BLOOD PRESSURE: 83 MMHG

## 2024-08-23 DIAGNOSIS — N52.9 ERECTILE DYSFUNCTION, UNSPECIFIED ERECTILE DYSFUNCTION TYPE: Primary | ICD-10-CM

## 2024-08-23 RX ORDER — SILDENAFIL 100 MG/1
TABLET, FILM COATED ORAL
Qty: 36 TABLET | Refills: 3 | Status: SHIPPED | OUTPATIENT
Start: 2024-08-23

## 2024-08-26 ENCOUNTER — TELEPHONE (OUTPATIENT)
Dept: CARDIOLOGY | Facility: CLINIC | Age: 74
End: 2024-08-26
Payer: COMMERCIAL

## 2024-08-26 NOTE — TELEPHONE ENCOUNTER
Patient called and LM stating he had a cardioversion done 8/22/24 with Dr. Arvizu ordered by Dr. Inocencio Martinez MD. Patient stated his HR is still abnormal 90s-150s. Patient stated he is tired and lightheaded. Current BP: 100/73. Patient does not currently follow with EP. Routed to Tila Bowman LPN

## 2024-08-26 NOTE — TELEPHONE ENCOUNTER
Per Dr. Inocencio Martinez , get an EKG on patient and if in A fib, then schedule patient to see Kavitha.

## 2024-08-27 ENCOUNTER — APPOINTMENT (OUTPATIENT)
Dept: CARDIOLOGY | Facility: CLINIC | Age: 74
End: 2024-08-27
Payer: COMMERCIAL

## 2024-08-27 ENCOUNTER — CLINICAL SUPPORT (OUTPATIENT)
Dept: CARDIOLOGY | Facility: CLINIC | Age: 74
End: 2024-08-27
Payer: COMMERCIAL

## 2024-08-27 DIAGNOSIS — I48.0 PAROXYSMAL ATRIAL FIBRILLATION (MULTI): ICD-10-CM

## 2024-08-27 PROCEDURE — 93000 ELECTROCARDIOGRAM COMPLETE: CPT | Performed by: INTERNAL MEDICINE

## 2024-08-27 NOTE — PROGRESS NOTES
Pt here for EKG ordered by Dr. Martinez for tachy, possible afib. Per Dr. Martinez, if pt is in afib, pt to see Kavitha. Per Kavitha Bey CNP, pt in afib, schedule OV this week. Pt advised and scheduled for 8/29/24 at 11:30am.

## 2024-08-29 ENCOUNTER — OFFICE VISIT (OUTPATIENT)
Dept: CARDIOLOGY | Facility: CLINIC | Age: 74
End: 2024-08-29
Payer: COMMERCIAL

## 2024-08-29 VITALS
SYSTOLIC BLOOD PRESSURE: 108 MMHG | WEIGHT: 272 LBS | HEART RATE: 106 BPM | BODY MASS INDEX: 34.92 KG/M2 | DIASTOLIC BLOOD PRESSURE: 72 MMHG

## 2024-08-29 DIAGNOSIS — I48.0 PAROXYSMAL ATRIAL FIBRILLATION (MULTI): Primary | ICD-10-CM

## 2024-08-29 DIAGNOSIS — I10 ESSENTIAL HYPERTENSION: ICD-10-CM

## 2024-08-29 DIAGNOSIS — I65.29 STENOSIS OF CAROTID ARTERY, UNSPECIFIED LATERALITY: ICD-10-CM

## 2024-08-29 DIAGNOSIS — R07.89 ATYPICAL CHEST PAIN: ICD-10-CM

## 2024-08-29 DIAGNOSIS — I48.19 PERSISTENT ATRIAL FIBRILLATION (MULTI): Primary | ICD-10-CM

## 2024-08-29 DIAGNOSIS — E78.2 MIXED HYPERLIPIDEMIA: ICD-10-CM

## 2024-08-29 DIAGNOSIS — I49.3 PVC (PREMATURE VENTRICULAR CONTRACTION): ICD-10-CM

## 2024-08-29 DIAGNOSIS — I36.1 NONRHEUMATIC TRICUSPID VALVE REGURGITATION: ICD-10-CM

## 2024-08-29 DIAGNOSIS — I34.0 NONRHEUMATIC MITRAL VALVE REGURGITATION: ICD-10-CM

## 2024-08-29 DIAGNOSIS — I48.0 PAROXYSMAL ATRIAL FIBRILLATION (MULTI): ICD-10-CM

## 2024-08-29 PROCEDURE — 99215 OFFICE O/P EST HI 40 MIN: CPT | Performed by: NURSE PRACTITIONER

## 2024-08-29 PROCEDURE — 3074F SYST BP LT 130 MM HG: CPT | Performed by: NURSE PRACTITIONER

## 2024-08-29 PROCEDURE — 1159F MED LIST DOCD IN RCRD: CPT | Performed by: NURSE PRACTITIONER

## 2024-08-29 PROCEDURE — 1036F TOBACCO NON-USER: CPT | Performed by: NURSE PRACTITIONER

## 2024-08-29 PROCEDURE — 3078F DIAST BP <80 MM HG: CPT | Performed by: NURSE PRACTITIONER

## 2024-08-29 PROCEDURE — 1160F RVW MEDS BY RX/DR IN RCRD: CPT | Performed by: NURSE PRACTITIONER

## 2024-08-29 NOTE — PROGRESS NOTES
"CARDIOLOGY OFFICE VISIT      CHIEF COMPLAINT  Chief Complaint   Patient presents with    Follow-up     Chief complaint: \"I have this pain in my right upper back that occurs when I am doing anything strenuous and is relieved when I stop the activity.\"  HISTORY OF PRESENT ILLNESS  HPI  History: The patient is a 74-year-old  male who is followed for paroxysmal atrial fibrillation, trending towards persistent atrial fibrillation on metoprolol, magnesium oxide, and anticoagulated with Eliquis.  The patient states that he was diagnosed with atrial fibrillation approximately 30 years ago when he began following with Dr. Jiménez.  Medical records dating back to September 30, 2003.  The first mention of atrial fibrillation is in 2013.  Patient has been followed by Dr. Ivory since April 4, 2013.  The patient reports that he only has vague symptoms such as fatigue and denies cardiac chest pain, palpitations,  abdominal distention, or orthopnea.  He states he does experience of shortness of breath and lightheadedness with exertion.  The patient reports that he has a pain in his right upper back over his scapula that occurs with strenuous activity.  He states the pain is relieved when he stops the activity.  He did undergo evaluation at the [facility] and was told it was a muscle spasm.  The patient underwent a Lexiscan stress test on April 4, 2013 which revealed an ejection fraction of 46% with no acute ischemic changes or infarct patterns.  The records state that the patient has dyslipidemia however he is not on any cholesterol-lowering medication at this time.  He is followed for hypertension, obstructive sleep apnea on CPAP, remote tobacco use and class I obesity, BMI 34.92.  He states that his mother and his sister have both been treated for atrial fibrillation.  The patient is accompanied by his wife who states that the patient's sister underwent an ablation for the treatment of atrial fibrillation " approximately 10 years ago.  Review of the medical records reveals the patient underwent a RAKAN guided cardioversion on 2024 with restoration of sinus rhythm.  The patient reports he believes he reverted to atrial fibrillation within 24 hours.  The RAKAN revealed normal left ventricular function with 1-2+ mitral regurgitation and borderline left atrial enlargement.  Past Medical History  Past Medical History:   Diagnosis Date    Arrhythmia     Body mass index (BMI) 37.0-37.9, adult     BMI 37.0-37.9, adult    Cancer (Multi)     Hyperlipidemia     Hypertension     Rash and other nonspecific skin eruption 2021    Rash       Social History  Social History     Tobacco Use    Smoking status: Former     Current packs/day: 0.00     Average packs/day: 1 pack/day for 15.0 years (15.0 ttl pk-yrs)     Types: Cigarettes     Start date:      Quit date:      Years since quittin.6     Passive exposure: Past    Smokeless tobacco: Never   Substance Use Topics    Alcohol use: Not Currently     Comment: 1x weekly    Drug use: Never       Family History     Family History   Problem Relation Name Age of Onset    Irregular heart beat Mother      Other (arteriosclerotic cardiovascular disease) Sister      Lung cancer Sister      Colon cancer Brother          Allergies:  Allergies   Allergen Reactions    Doxycycline Other    Metoprolol Unknown    Silver Sulfadiazine Rash        Outpatient Medications:  Current Outpatient Medications   Medication Instructions    apixaban (ELIQUIS) 5 mg, oral, 2 times daily    atenolol (TENORMIN) 50 mg, oral, Daily    clotrimazole-betamethasone (Lotrisone) cream 1 Application, Topical, See admin instructions, Apply as directed by physician    coenzyme Q-10 100 mg, oral, Daily    latanoprost (Xalatan) 0.005 % ophthalmic solution 1 drop, ophthalmic (eye), Daily RT    losartan (COZAAR) 50 mg, oral, Daily    magnesium oxide (MAG-OX) 400 mg, oral, Daily    sildenafil (Viagra) 100 mg  tablet take 1 tablet by mouth once daily 1 hour before needed          REVIEW OF SYSTEMS  Review of Systems   All other systems reviewed and are negative.        VITALS  Vitals:    08/29/24 1123   BP: 108/72   Pulse: 106       PHYSICAL EXAM  Vitals and nursing note reviewed.   Constitutional:       Appearance: Normal appearance.   HENT:      Head: Normocephalic.   Neck:      Vascular: No JVD. Carotid upstrokes II/IV.  Cardiovascular:      Rate and Rhythm: Tachycardic rate and irregular rhythm.      Pulses: Normal pulses.      Heart sounds: Normal S1 S2, no S3 S4.  No murmurs or rubs.     1+ right lower extremity edema noted.  Pulmonary:      Effort: Pulmonary effort is normal. Respirations regular and nonlabored.     Breath sounds: Clear to auscultation posterior laterally.  Abdominal:      General: Bowel sounds are normal.      Palpations: Abdomen is soft.   Musculoskeletal:         General: Normal range of motion.      Cervical back: Normal range of motion.   Skin:     General: Skin is warm and dry.   Neurological:      General: No focal deficit present.      Mental Status: Alert and oriented to person, place, and time.      Motor: Motor function is intact.   Psychiatric:         Attention and Perception: Attention and perception normal.         Mood and Affect: Mood and affect normal.         Speech: Speech normal.         Behavior: Behavior normal. Behavior is cooperative.         Thought Content: Thought content normal.         Cognition and Memory: Cognition and memory normal.     Labs and testing: Twelve-lead EKG reveals atrial fibrillation with rapid ventricular response, ventricular rate 106 bpm.  QRS duration 98 ms,  ms, QTc 441 ms.  No acute ischemic changes are noted.  Carotid duplex scan dated May 24, 2024 reveals less than 50% bilateral internal carotid artery stenosis.      ASSESSMENT AND PLAN    Clinical impressions:  1.  Persistent atrial fibrillation on beta-blockade, magnesium oxide, and  anticoagulated with Eliquis.  2.  Atypical chest pain.  Lexiscan stress test April 4, 2013 revealing no evidence of acute ischemic changes or infarct patterns with an ejection fraction of 46%.  3.  RAKAN dated August 22, 2024 revealing normal left ventricular function and 1-2+ MR.  4.  Carotid duplex scan dated May 24, 2024 revealing less than 50% bilateral internal carotid artery stenosis.  5.  Pulmonary hypertension, right ventricular systolic pressure 31.9 mmHg per 2D echocardiogram dated April 24, 2024.  6.  Class I obesity, BMI 34.92.  7.  Hypertension, controlled, blood pressure 108/72.  8.  Obstructive sleep apnea on CPAP  9.  Remote tobacco use.    Recommendations:  1.  Discussed anatomy and physiology of the documented arrhythmia.  Treatment options reviewed in detail. Instructed to take all current medications as prescribed.  Limit caffeine and alcohol consumption to two beverages per day.  Increase water intake to 64 ounces per day.  Refrain from over the counter cold medications in tablet form.  Treat symptoms:  nasal spray for nasal congestion; zinc lozenges for sore throat; plain Robitussin or Delsym for cough; increase vitamin C intake.  Exercise five or more days per week for 30 minutes per session per American Heart Association guidelines.  2.  I discussed with the patient antiarrhythmic drug loading with the plan for PVI in the near future.  I did contact Dr. Riley.  The patient will be admitted for antiarrhythmic drug loading with sotalol as soon as possible.  I explained to the patient he may require cardioversion for restoration of sinus rhythm during the admission for drug loading.  3.  The procedure for PVI was reviewed with the patient and his wife in detail.  All questions were answered.  4.  The patient will undergo a nuclear stress test for ischemic evaluation with complaints of atypical chest pain.  Further recommendations will be pending those results.  5.  The patient will be scheduled  for follow-up with Dr. Riley after drug loading and cardioversion for discussion regarding PVI.  6.  Patient was provided with my direct phone number for any additional questions or concerns.    Evaluation and note by Kavitha Garza CNP  **Please excuse any errors in grammar or translation related to this dictation.  Voice recognition software was utilized to prepare this document.**

## 2024-09-03 ENCOUNTER — APPOINTMENT (OUTPATIENT)
Dept: CARDIOLOGY | Facility: CLINIC | Age: 74
End: 2024-09-03
Payer: COMMERCIAL

## 2024-09-04 ENCOUNTER — APPOINTMENT (OUTPATIENT)
Dept: CARDIOLOGY | Facility: HOSPITAL | Age: 74
End: 2024-09-04
Payer: COMMERCIAL

## 2024-09-04 ENCOUNTER — PREP FOR PROCEDURE (OUTPATIENT)
Dept: CARDIOLOGY | Facility: HOSPITAL | Age: 74
End: 2024-09-04
Payer: COMMERCIAL

## 2024-09-04 ENCOUNTER — HOSPITAL ENCOUNTER (INPATIENT)
Facility: HOSPITAL | Age: 74
LOS: 2 days | Discharge: HOME | End: 2024-09-06
Attending: INTERNAL MEDICINE | Admitting: INTERNAL MEDICINE
Payer: COMMERCIAL

## 2024-09-04 DIAGNOSIS — I10 ESSENTIAL HYPERTENSION: ICD-10-CM

## 2024-09-04 DIAGNOSIS — I48.0 PAROXYSMAL ATRIAL FIBRILLATION (MULTI): ICD-10-CM

## 2024-09-04 DIAGNOSIS — I48.19 ATRIAL FIBRILLATION, PERSISTENT (MULTI): Primary | ICD-10-CM

## 2024-09-04 DIAGNOSIS — I48.19 PERSISTENT ATRIAL FIBRILLATION (MULTI): ICD-10-CM

## 2024-09-04 LAB
ALBUMIN SERPL BCP-MCNC: 4.2 G/DL (ref 3.4–5)
ALP SERPL-CCNC: 52 U/L (ref 33–136)
ALT SERPL W P-5'-P-CCNC: 13 U/L (ref 10–52)
ANION GAP SERPL CALC-SCNC: 12 MMOL/L (ref 10–20)
APTT PPP: 40 SECONDS (ref 27–38)
AST SERPL W P-5'-P-CCNC: 14 U/L (ref 9–39)
ATRIAL RATE: 326 BPM
ATRIAL RATE: 73 BPM
BILIRUB SERPL-MCNC: 0.5 MG/DL (ref 0–1.2)
BUN SERPL-MCNC: 16 MG/DL (ref 6–23)
CALCIUM SERPL-MCNC: 9.7 MG/DL (ref 8.6–10.3)
CHLORIDE SERPL-SCNC: 102 MMOL/L (ref 98–107)
CO2 SERPL-SCNC: 29 MMOL/L (ref 21–32)
CREAT SERPL-MCNC: 0.99 MG/DL (ref 0.5–1.3)
EGFRCR SERPLBLD CKD-EPI 2021: 79 ML/MIN/1.73M*2
ERYTHROCYTE [DISTWIDTH] IN BLOOD BY AUTOMATED COUNT: 13.2 % (ref 11.5–14.5)
GLUCOSE SERPL-MCNC: 100 MG/DL (ref 74–99)
HCT VFR BLD AUTO: 50 % (ref 41–52)
HGB BLD-MCNC: 16.7 G/DL (ref 13.5–17.5)
HOLD SPECIMEN: NORMAL
INR PPP: 1.4 (ref 0.9–1.1)
MAGNESIUM SERPL-MCNC: 2.03 MG/DL (ref 1.6–2.4)
MCH RBC QN AUTO: 30.5 PG (ref 26–34)
MCHC RBC AUTO-ENTMCNC: 33.4 G/DL (ref 32–36)
MCV RBC AUTO: 91 FL (ref 80–100)
NRBC BLD-RTO: 0 /100 WBCS (ref 0–0)
PLATELET # BLD AUTO: 221 X10*3/UL (ref 150–450)
POTASSIUM SERPL-SCNC: 4.5 MMOL/L (ref 3.5–5.3)
PROT SERPL-MCNC: 7.4 G/DL (ref 6.4–8.2)
PROTHROMBIN TIME: 15.5 SECONDS (ref 9.8–12.8)
Q ONSET: 219 MS
Q ONSET: 220 MS
QRS COUNT: 14 BEATS
QRS COUNT: 16 BEATS
QRS DURATION: 92 MS
QRS DURATION: 94 MS
QT INTERVAL: 302 MS
QT INTERVAL: 358 MS
QTC CALCULATION(BAZETT): 381 MS
QTC CALCULATION(BAZETT): 430 MS
QTC FREDERICIA: 353 MS
QTC FREDERICIA: 405 MS
R AXIS: -2 DEGREES
R AXIS: 38 DEGREES
RBC # BLD AUTO: 5.48 X10*6/UL (ref 4.5–5.9)
SODIUM SERPL-SCNC: 138 MMOL/L (ref 136–145)
T AXIS: 12 DEGREES
T AXIS: 47 DEGREES
T OFFSET: 371 MS
T OFFSET: 398 MS
VENTRICULAR RATE: 87 BPM
VENTRICULAR RATE: 96 BPM
WBC # BLD AUTO: 8.5 X10*3/UL (ref 4.4–11.3)

## 2024-09-04 PROCEDURE — 84075 ASSAY ALKALINE PHOSPHATASE: CPT | Performed by: NURSE PRACTITIONER

## 2024-09-04 PROCEDURE — 36415 COLL VENOUS BLD VENIPUNCTURE: CPT | Performed by: NURSE PRACTITIONER

## 2024-09-04 PROCEDURE — 2060000001 HC INTERMEDIATE ICU ROOM DAILY

## 2024-09-04 PROCEDURE — 99222 1ST HOSP IP/OBS MODERATE 55: CPT | Performed by: NURSE PRACTITIONER

## 2024-09-04 PROCEDURE — 85610 PROTHROMBIN TIME: CPT | Performed by: NURSE PRACTITIONER

## 2024-09-04 PROCEDURE — 93005 ELECTROCARDIOGRAM TRACING: CPT

## 2024-09-04 PROCEDURE — 93010 ELECTROCARDIOGRAM REPORT: CPT | Performed by: INTERNAL MEDICINE

## 2024-09-04 PROCEDURE — 85027 COMPLETE CBC AUTOMATED: CPT | Performed by: NURSE PRACTITIONER

## 2024-09-04 PROCEDURE — 2500000001 HC RX 250 WO HCPCS SELF ADMINISTERED DRUGS (ALT 637 FOR MEDICARE OP): Performed by: NURSE PRACTITIONER

## 2024-09-04 PROCEDURE — 83735 ASSAY OF MAGNESIUM: CPT | Performed by: NURSE PRACTITIONER

## 2024-09-04 RX ORDER — SOTALOL HYDROCHLORIDE 80 MG/1
80 TABLET ORAL 2 TIMES DAILY
Status: DISCONTINUED | OUTPATIENT
Start: 2024-09-04 | End: 2024-09-06 | Stop reason: HOSPADM

## 2024-09-04 RX ORDER — OMEPRAZOLE 40 MG/1
40 CAPSULE, DELAYED RELEASE ORAL
COMMUNITY

## 2024-09-04 RX ORDER — ATENOLOL 25 MG/1
25 TABLET ORAL DAILY
Status: DISCONTINUED | OUTPATIENT
Start: 2024-09-04 | End: 2024-09-06 | Stop reason: HOSPADM

## 2024-09-04 RX ORDER — LATANOPROST 50 UG/ML
1 SOLUTION/ DROPS OPHTHALMIC
Status: DISCONTINUED | OUTPATIENT
Start: 2024-09-05 | End: 2024-09-06 | Stop reason: HOSPADM

## 2024-09-04 RX ORDER — PANTOPRAZOLE SODIUM 40 MG/1
40 TABLET, DELAYED RELEASE ORAL
Status: DISCONTINUED | OUTPATIENT
Start: 2024-09-05 | End: 2024-09-06 | Stop reason: HOSPADM

## 2024-09-04 RX ORDER — LANOLIN ALCOHOL/MO/W.PET/CERES
400 CREAM (GRAM) TOPICAL DAILY
Status: DISCONTINUED | OUTPATIENT
Start: 2024-09-04 | End: 2024-09-06 | Stop reason: HOSPADM

## 2024-09-04 RX ORDER — ACETAMINOPHEN 325 MG/1
650 TABLET ORAL EVERY 4 HOURS PRN
Status: DISCONTINUED | OUTPATIENT
Start: 2024-09-04 | End: 2024-09-06 | Stop reason: HOSPADM

## 2024-09-04 RX ORDER — LOSARTAN POTASSIUM 100 MG/1
100 TABLET ORAL DAILY
Status: DISCONTINUED | OUTPATIENT
Start: 2024-09-04 | End: 2024-09-06 | Stop reason: HOSPADM

## 2024-09-04 SDOH — HEALTH STABILITY: MENTAL HEALTH
HOW OFTEN DO YOU NEED TO HAVE SOMEONE HELP YOU WHEN YOU READ INSTRUCTIONS, PAMPHLETS, OR OTHER WRITTEN MATERIAL FROM YOUR DOCTOR OR PHARMACY?: NEVER

## 2024-09-04 SDOH — SOCIAL STABILITY: SOCIAL INSECURITY: ARE THERE ANY APPARENT SIGNS OF INJURIES/BEHAVIORS THAT COULD BE RELATED TO ABUSE/NEGLECT?: NO

## 2024-09-04 SDOH — SOCIAL STABILITY: SOCIAL NETWORK: ARE YOU MARRIED, WIDOWED, DIVORCED, SEPARATED, NEVER MARRIED, OR LIVING WITH A PARTNER?: MARRIED

## 2024-09-04 SDOH — SOCIAL STABILITY: SOCIAL INSECURITY: DOES ANYONE TRY TO KEEP YOU FROM HAVING/CONTACTING OTHER FRIENDS OR DOING THINGS OUTSIDE YOUR HOME?: NO

## 2024-09-04 SDOH — SOCIAL STABILITY: SOCIAL INSECURITY: DO YOU FEEL ANYONE HAS EXPLOITED OR TAKEN ADVANTAGE OF YOU FINANCIALLY OR OF YOUR PERSONAL PROPERTY?: NO

## 2024-09-04 SDOH — HEALTH STABILITY: MENTAL HEALTH
STRESS IS WHEN SOMEONE FEELS TENSE, NERVOUS, ANXIOUS, OR CAN'T SLEEP AT NIGHT BECAUSE THEIR MIND IS TROUBLED. HOW STRESSED ARE YOU?: NOT AT ALL

## 2024-09-04 SDOH — SOCIAL STABILITY: SOCIAL NETWORK: HOW OFTEN DO YOU GET TOGETHER WITH FRIENDS OR RELATIVES?: NEVER

## 2024-09-04 SDOH — SOCIAL STABILITY: SOCIAL INSECURITY: ABUSE: ADULT

## 2024-09-04 SDOH — ECONOMIC STABILITY: FOOD INSECURITY: WITHIN THE PAST 12 MONTHS, THE FOOD YOU BOUGHT JUST DIDN'T LAST AND YOU DIDN'T HAVE MONEY TO GET MORE.: NEVER TRUE

## 2024-09-04 SDOH — ECONOMIC STABILITY: FOOD INSECURITY: WITHIN THE PAST 12 MONTHS, YOU WORRIED THAT YOUR FOOD WOULD RUN OUT BEFORE YOU GOT MONEY TO BUY MORE.: NEVER TRUE

## 2024-09-04 SDOH — SOCIAL STABILITY: SOCIAL INSECURITY
WITHIN THE LAST YEAR, HAVE YOU BEEN KICKED, HIT, SLAPPED, OR OTHERWISE PHYSICALLY HURT BY YOUR PARTNER OR EX-PARTNER?: NO

## 2024-09-04 SDOH — SOCIAL STABILITY: SOCIAL NETWORK
DO YOU BELONG TO ANY CLUBS OR ORGANIZATIONS SUCH AS CHURCH GROUPS UNIONS, FRATERNAL OR ATHLETIC GROUPS, OR SCHOOL GROUPS?: NO

## 2024-09-04 SDOH — ECONOMIC STABILITY: INCOME INSECURITY: IN THE PAST 12 MONTHS, HAS THE ELECTRIC, GAS, OIL, OR WATER COMPANY THREATENED TO SHUT OFF SERVICE IN YOUR HOME?: NO

## 2024-09-04 SDOH — SOCIAL STABILITY: SOCIAL NETWORK: HOW OFTEN DO YOU ATTENT MEETINGS OF THE CLUB OR ORGANIZATION YOU BELONG TO?: NEVER

## 2024-09-04 SDOH — SOCIAL STABILITY: SOCIAL INSECURITY: WERE YOU ABLE TO COMPLETE ALL THE BEHAVIORAL HEALTH SCREENINGS?: YES

## 2024-09-04 SDOH — HEALTH STABILITY: PHYSICAL HEALTH
ON AVERAGE, HOW MANY DAYS PER WEEK DO YOU ENGAGE IN MODERATE TO STRENUOUS EXERCISE (LIKE A BRISK WALK)?: PATIENT DECLINED

## 2024-09-04 SDOH — ECONOMIC STABILITY: HOUSING INSECURITY: AT ANY TIME IN THE PAST 12 MONTHS, WERE YOU HOMELESS OR LIVING IN A SHELTER (INCLUDING NOW)?: NO

## 2024-09-04 SDOH — ECONOMIC STABILITY: TRANSPORTATION INSECURITY
IN THE PAST 12 MONTHS, HAS THE LACK OF TRANSPORTATION KEPT YOU FROM MEDICAL APPOINTMENTS OR FROM GETTING MEDICATIONS?: NO

## 2024-09-04 SDOH — SOCIAL STABILITY: SOCIAL INSECURITY: WITHIN THE LAST YEAR, HAVE YOU BEEN AFRAID OF YOUR PARTNER OR EX-PARTNER?: NO

## 2024-09-04 SDOH — SOCIAL STABILITY: SOCIAL INSECURITY: HAVE YOU HAD ANY THOUGHTS OF HARMING ANYONE ELSE?: NO

## 2024-09-04 SDOH — SOCIAL STABILITY: SOCIAL INSECURITY: WITHIN THE LAST YEAR, HAVE YOU BEEN HUMILIATED OR EMOTIONALLY ABUSED IN OTHER WAYS BY YOUR PARTNER OR EX-PARTNER?: NO

## 2024-09-04 SDOH — ECONOMIC STABILITY: INCOME INSECURITY: HOW HARD IS IT FOR YOU TO PAY FOR THE VERY BASICS LIKE FOOD, HOUSING, MEDICAL CARE, AND HEATING?: PATIENT DECLINED

## 2024-09-04 SDOH — SOCIAL STABILITY: SOCIAL INSECURITY
WITHIN THE LAST YEAR, HAVE TO BEEN RAPED OR FORCED TO HAVE ANY KIND OF SEXUAL ACTIVITY BY YOUR PARTNER OR EX-PARTNER?: NO

## 2024-09-04 SDOH — HEALTH STABILITY: PHYSICAL HEALTH: ON AVERAGE, HOW MANY MINUTES DO YOU ENGAGE IN EXERCISE AT THIS LEVEL?: PATIENT DECLINED

## 2024-09-04 SDOH — SOCIAL STABILITY: SOCIAL INSECURITY: HAS ANYONE EVER THREATENED TO HURT YOUR FAMILY OR YOUR PETS?: NO

## 2024-09-04 SDOH — SOCIAL STABILITY: SOCIAL INSECURITY: DO YOU FEEL UNSAFE GOING BACK TO THE PLACE WHERE YOU ARE LIVING?: NO

## 2024-09-04 SDOH — SOCIAL STABILITY: SOCIAL NETWORK: IN A TYPICAL WEEK, HOW MANY TIMES DO YOU TALK ON THE PHONE WITH FAMILY, FRIENDS, OR NEIGHBORS?: NEVER

## 2024-09-04 SDOH — ECONOMIC STABILITY: TRANSPORTATION INSECURITY
IN THE PAST 12 MONTHS, HAS LACK OF TRANSPORTATION KEPT YOU FROM MEETINGS, WORK, OR FROM GETTING THINGS NEEDED FOR DAILY LIVING?: NO

## 2024-09-04 SDOH — ECONOMIC STABILITY: INCOME INSECURITY: IN THE LAST 12 MONTHS, WAS THERE A TIME WHEN YOU WERE NOT ABLE TO PAY THE MORTGAGE OR RENT ON TIME?: YES

## 2024-09-04 SDOH — SOCIAL STABILITY: SOCIAL INSECURITY: HAVE YOU HAD THOUGHTS OF HARMING ANYONE ELSE?: NO

## 2024-09-04 SDOH — ECONOMIC STABILITY: HOUSING INSECURITY: IN THE PAST 12 MONTHS, HOW MANY TIMES HAVE YOU MOVED WHERE YOU WERE LIVING?: 1

## 2024-09-04 SDOH — SOCIAL STABILITY: SOCIAL INSECURITY: ARE YOU OR HAVE YOU BEEN THREATENED OR ABUSED PHYSICALLY, EMOTIONALLY, OR SEXUALLY BY ANYONE?: NO

## 2024-09-04 SDOH — SOCIAL STABILITY: SOCIAL NETWORK: HOW OFTEN DO YOU ATTEND CHURCH OR RELIGIOUS SERVICES?: NEVER

## 2024-09-04 ASSESSMENT — COGNITIVE AND FUNCTIONAL STATUS - GENERAL
DAILY ACTIVITIY SCORE: 24
PATIENT BASELINE BEDBOUND: NO
MOBILITY SCORE: 24
MOBILITY SCORE: 24

## 2024-09-04 ASSESSMENT — LIFESTYLE VARIABLES
AUDIT-C TOTAL SCORE: 0
SUBSTANCE_ABUSE_PAST_12_MONTHS: NO
PRESCIPTION_ABUSE_PAST_12_MONTHS: NO
HOW MANY STANDARD DRINKS CONTAINING ALCOHOL DO YOU HAVE ON A TYPICAL DAY: PATIENT DOES NOT DRINK
AUDIT-C TOTAL SCORE: 0
HOW OFTEN DO YOU HAVE A DRINK CONTAINING ALCOHOL: NEVER
SKIP TO QUESTIONS 9-10: 1
HOW OFTEN DO YOU HAVE 6 OR MORE DRINKS ON ONE OCCASION: NEVER

## 2024-09-04 ASSESSMENT — ACTIVITIES OF DAILY LIVING (ADL)
PATIENT'S MEMORY ADEQUATE TO SAFELY COMPLETE DAILY ACTIVITIES?: YES
HEARING - RIGHT EAR: FUNCTIONAL
BATHING: INDEPENDENT
HEARING - LEFT EAR: FUNCTIONAL
JUDGMENT_ADEQUATE_SAFELY_COMPLETE_DAILY_ACTIVITIES: YES
GROOMING: INDEPENDENT
DRESSING YOURSELF: INDEPENDENT
TOILETING: INDEPENDENT
WALKS IN HOME: INDEPENDENT
ADEQUATE_TO_COMPLETE_ADL: YES
FEEDING YOURSELF: INDEPENDENT

## 2024-09-04 ASSESSMENT — PAIN - FUNCTIONAL ASSESSMENT
PAIN_FUNCTIONAL_ASSESSMENT: 0-10
PAIN_FUNCTIONAL_ASSESSMENT: 0-10

## 2024-09-04 ASSESSMENT — ENCOUNTER SYMPTOMS
FATIGUE: 1
SHORTNESS OF BREATH: 1

## 2024-09-04 ASSESSMENT — PATIENT HEALTH QUESTIONNAIRE - PHQ9
1. LITTLE INTEREST OR PLEASURE IN DOING THINGS: NOT AT ALL
SUM OF ALL RESPONSES TO PHQ9 QUESTIONS 1 & 2: 0
2. FEELING DOWN, DEPRESSED OR HOPELESS: NOT AT ALL

## 2024-09-04 ASSESSMENT — COLUMBIA-SUICIDE SEVERITY RATING SCALE - C-SSRS
1. IN THE PAST MONTH, HAVE YOU WISHED YOU WERE DEAD OR WISHED YOU COULD GO TO SLEEP AND NOT WAKE UP?: NO
2. HAVE YOU ACTUALLY HAD ANY THOUGHTS OF KILLING YOURSELF?: NO
6. HAVE YOU EVER DONE ANYTHING, STARTED TO DO ANYTHING, OR PREPARED TO DO ANYTHING TO END YOUR LIFE?: NO

## 2024-09-04 ASSESSMENT — PAIN SCALES - GENERAL
PAINLEVEL_OUTOF10: 0 - NO PAIN
PAINLEVEL_OUTOF10: 0 - NO PAIN

## 2024-09-04 NOTE — H&P
History Of Present Illness  Venancio Cervantes is a 74 y.o. male presenting with Persistent atrial fibrillation for antiarrhythmic drug load with sotalol.  Patient has valva paroxysmal atrial fibrillation trending towards persistent atrial fib maintained on metoprolol, magnesium oxide and anticoagulation with Eliquis.  Patient was first diagnosed approximately 30 years ago with atrial fibrillation.  Patient has fatigue however denies chest pain, palpitations, or dizziness.  He does experience shortness of breath and lightheadedness with exertion.  Patient is followed with Dr. RICH since April 4, 2013.  Did undergo a Lexiscan stress test April 4, 2013 which revealed LVEF 46% and no acute ischemic changes or infarct patterns.  Patient underwent a RAKAN guided cardioversion August 22, 2024 with restoration of sinus rhythm.  Patient believes he went into atrial for within 24 hours after the procedure.  The RAKAN revealed normal LVEF with 1-2+ MR and borderline left atrial enlargement.  Other past medical history includes hypertension, obstructive sleep apnea on CPAP, remote tobacco use, and class I obesity with BMI of 33.78.  Past Medical History  Past Medical History:   Diagnosis Date    Arrhythmia     Body mass index (BMI) 37.0-37.9, adult     BMI 37.0-37.9, adult    Cancer (Multi)     Hyperlipidemia     Hypertension     Rash and other nonspecific skin eruption 12/06/2021    Rash       Surgical History  Past Surgical History:   Procedure Laterality Date    OTHER SURGICAL HISTORY  11/11/2021    Cardioversion    OTHER SURGICAL HISTORY  11/11/2021    Ankle surgery    OTHER SURGICAL HISTORY  11/11/2021    Complete colonoscopy    OTHER SURGICAL HISTORY  11/11/2021    Rotator cuff repair    OTHER SURGICAL HISTORY  11/11/2021    Knee replacement    OTHER SURGICAL HISTORY  11/11/2021    Facial surgery        Social History  He reports that he quit smoking about 41 years ago. His smoking use included cigarettes. He started smoking  "about 56 years ago. He has a 15 pack-year smoking history. He has been exposed to tobacco smoke. He has never used smokeless tobacco. He reports that he does not currently use alcohol. He reports that he does not use drugs.    Family History  Family History   Problem Relation Name Age of Onset    Irregular heart beat Mother      Other (arteriosclerotic cardiovascular disease) Sister      Lung cancer Sister      Colon cancer Brother          Allergies  Doxycycline, Metoprolol, and Silver sulfadiazine    Review of Systems   Constitutional:  Positive for fatigue.   Respiratory:  Positive for shortness of breath.           Physical Exam  Constitutional:       Appearance: Normal appearance.   HENT:      Head: Normocephalic.      Nose: Nose normal.      Mouth/Throat:      Mouth: Mucous membranes are moist.   Eyes:      Pupils: Pupils are equal, round, and reactive to light.   Cardiovascular:      Rate and Rhythm: Normal rate. Rhythm irregular.      Pulses: Normal pulses.   Pulmonary:      Effort: Pulmonary effort is normal.      Breath sounds: Normal breath sounds.   Abdominal:      General: Bowel sounds are normal.   Musculoskeletal:         General: Normal range of motion.   Skin:     General: Skin is warm and dry.   Neurological:      General: No focal deficit present.      Mental Status: He is alert and oriented to person, place, and time.   Psychiatric:         Mood and Affect: Mood normal.         Behavior: Behavior normal.            Last Recorded Vitals  Blood pressure 127/81, pulse 103, temperature 36.8 °C (98.2 °F), temperature source Temporal, resp. rate 18, height 1.88 m (6' 2.02\"), weight 119 kg (263 lb 3.7 oz), SpO2 93%.    Relevant Results  Scheduled medications  apixaban, 5 mg, oral, BID  atenolol, 25 mg, oral, Daily  [START ON 9/5/2024] latanoprost, 1 drop, Both Eyes, Daily  losartan, 100 mg, oral, Daily  magnesium oxide, 400 mg, oral, Daily  [START ON 9/5/2024] pantoprazole, 40 mg, oral, Daily before " breakfast  sotalol, 80 mg, oral, BID      Continuous medications     PRN medications  PRN medications: acetaminophen     Results for orders placed or performed during the hospital encounter of 09/04/24 (from the past 24 hour(s))   Comprehensive Metabolic Panel   Result Value Ref Range    Glucose 100 (H) 74 - 99 mg/dL    Sodium 138 136 - 145 mmol/L    Potassium 4.5 3.5 - 5.3 mmol/L    Chloride 102 98 - 107 mmol/L    Bicarbonate 29 21 - 32 mmol/L    Anion Gap 12 10 - 20 mmol/L    Urea Nitrogen 16 6 - 23 mg/dL    Creatinine 0.99 0.50 - 1.30 mg/dL    eGFR 79 >60 mL/min/1.73m*2    Calcium 9.7 8.6 - 10.3 mg/dL    Albumin 4.2 3.4 - 5.0 g/dL    Alkaline Phosphatase 52 33 - 136 U/L    Total Protein 7.4 6.4 - 8.2 g/dL    AST 14 9 - 39 U/L    Bilirubin, Total 0.5 0.0 - 1.2 mg/dL    ALT 13 10 - 52 U/L   Coagulation Screen   Result Value Ref Range    Protime 15.5 (H) 9.8 - 12.8 seconds    INR 1.4 (H) 0.9 - 1.1    aPTT 40 (H) 27 - 38 seconds   Magnesium   Result Value Ref Range    Magnesium 2.03 1.60 - 2.40 mg/dL   CBC   Result Value Ref Range    WBC 8.5 4.4 - 11.3 x10*3/uL    nRBC 0.0 0.0 - 0.0 /100 WBCs    RBC 5.48 4.50 - 5.90 x10*6/uL    Hemoglobin 16.7 13.5 - 17.5 g/dL    Hematocrit 50.0 41.0 - 52.0 %    MCV 91 80 - 100 fL    MCH 30.5 26.0 - 34.0 pg    MCHC 33.4 32.0 - 36.0 g/dL    RDW 13.2 11.5 - 14.5 %    Platelets 221 150 - 450 x10*3/uL   SST TOP   Result Value Ref Range    Extra Tube Hold for add-ons.    ECG 12 Lead   Result Value Ref Range    Ventricular Rate 96 BPM    Atrial Rate 326 BPM    QRS Duration 92 ms    QT Interval 302 ms    QTC Calculation(Bazett) 381 ms    R Axis 38 degrees    T Axis 47 degrees    QRS Count 16 beats    Q Onset 220 ms    T Offset 371 ms    QTC Fredericia 353 ms        ECG 12 Lead    Result Date: 9/4/2024  Atrial fibrillation Abnormal ECG When compared with ECG of 22-AUG-2024 10:53, T wave inversion no longer evident in Inferior leads QT has shortened    ECG 12 lead (Clinic Performed)    Result  Date: 8/29/2024  See scanned document    Transesophageal Echo (RAKAN) With Possible Cardioversion    Result Date: 8/22/2024          [place] [id number]  Tel [phone] Fax [phone] TRANSESOPHAGEAL ECHOCARDIOGRAM REPORT Patient Name:      SEJAL ARZATE     Reading Physician:    [id number] Gallo Arvizu MD, [facility] Study Date:        8/22/2024            Ordering Provider:    [id number] FERN RUSSELL MRN/PID:           [id number]             Fellow: Accession#:        [id number]         Nurse:                Roderick Clarke RN Date of Birth/Age: 1950 / 74 years  Sonographer:          Cesia Bradley RDCS Gender:            M                    Additional Staff: Height:            187.96 cm            Admit Date: Weight:            121.56 kg            Admission Status:     Outpatient BSA / BMI:         2.46 m2 / 34.41      Department Location:  [address] [place] [address]/m2 Blood Pressure: 110 /73 mmHg Study Type:    TRANSESOPHAGEAL ECHO (RAKAN) W/ POSSIBLE CARDIOVERSION Diagnosis/ICD: Other persistent AFib-I48.19 Indication:    A.Fib CPT Codes:     RAKAN Complete-76520; Moderate Sedation Services initial 15 minutes                patient >5 years-[id number]  Study Detail: The following Echo studies were performed: 2D, Doppler and color               flow. Agitated saline used as a contrast agent for intraseptal               flow evaluation.  PHYSICIAN INTERPRETATION: RAKAN Details: The RAKAN probe used was [id number]. Technically adequate omniplane transesophageal echocardiogram performed. Agitated saline contrast and color flow Doppler echo was performed to assess for the presence of a patent foramen ovale. RAKAN Medication: The pharynx was anesthetized with Cetacaine spray and viscous lidocaine. The patient was sedated using moderate sedation. Midazolam and Fentanyl  was used to sedate the patient for this exam. RAKAN Procedure: The probe was passed without difficulty. The following complication was encountered during the procedure: Patient tolerated the procedure well without any apparent complications. RAKAN Cardioversion Procedure: The patient was placed in a supine position and anterior-posterior defibrillation patches were applied. A biphasic defibrillator was attached to the patient and set to synchronous mode. Sedation for the cardioversion was achieved using Propofol. The patient has been anticoagulated with: Eliquis. One synchronized biphasic external shock was delivered externally at 200 Joules in attempt to cardiovert the patient from atrial fibrillation. Has follow up with Dr. Martinez. If recurrent a.fib will need to consider EP consult. Left Ventricle: The left ventricular systolic function is normal, with a visually estimated ejection fraction of 60%. There are no regional wall motion abnormalities. The left ventricular cavity size is normal. Left ventricular diastolic filling was not assessed. Left Atrium: The left atrium is mildly dilated. There is no definite left atrial thrombus present. A bubble study using agitated saline was performed. Bubble study is negative. The left atrial appendage is enlarged and there is no thrombus visualized in the left atrial appendage. There is a moderate degree of spontaneous echo contrast in the left atrium. Mildly dilated LA/YAMIL. No YAMIL/LA thrombus Moderate spontaneous echo contrast LA/YAMIL No right to left shunt. Right Ventricle: The right ventricle is normal in size. There is normal right ventricular global systolic function. Right Atrium: The right atrium is normal in size. Aortic Valve: The aortic valve is trileaflet. There is no evidence of aortic valve regurgitation. Normal aortic valve. Mitral Valve: The mitral valve is normal in structure. There is mild mitral valve regurgitation. 1-2+ MR with normal appearing MV, MR  likely related to a.fib. Tricuspid Valve: The tricuspid valve is structurally normal. There is mild tricuspid regurgitation. The Doppler estimated RVSP is within normal limits at 18.1 mmHg. Pulmonic Valve: The pulmonic valve is structurally normal. There is no indication of pulmonic valve regurgitation. Pericardium: There is no pericardial effusion noted. Aorta: The aortic root is normal. Pulmonary Artery: The main pulmonary artery is normal in size, and position, with normal bifurcation into the left and right pulmonary arteries. Pulmonary Veins: The pulmonary veins appear normal and return normally to the left atrium.  CONCLUSIONS:  1. The left ventricular systolic function is normal, with a visually estimated ejection fraction of 60%.  2. Has follow up with Dr. Martinez. If recurrent a.fib will need to consider EP consult.  3. There is normal right ventricular global systolic function.  4. Mildly dilated LA/YAMIL.     No YAMIL/LA thrombus     Moderate spontaneous echo contrast LA/YAMIL     No right to left shunt.  5. 1-2+ MR with normal appearing MV, MR likely related to a.fib.  6. RVSP within normal limits.  7. Normal aortic valve.  8. The main pulmonary artery is normal in size, and position, with normal bifurcation into the left and right pulmonary arteries.  9. The pulmonary veins appear normal and return normally to the left atrium. 10. No left atrial thrombus. RECOMMENDATIONS: Recommendations for this patient include anticoagulation and beta blockade.  QUANTITATIVE DATA SUMMARY: LV SYSTOLIC FUNCTION BY 2D PLANIMETRY (MOD):                      Normal Ranges: EF-Visual:      60 % LV EF Reported: 60 % TRICUSPID VALVE/RVSP:                             Normal Ranges: Peak TR Velocity: 1.94 m/s RV Syst Pressure: 18.1 mmHg (< 30mmHg)  [id number] Gallo Arvizu MD, [facility] Electronically signed on 8/22/2024 at 5:48:49 PM  ** Final **     ECG 12 lead    Result Date: 8/22/2024  Atrial fibrillation Abnormal ECG When compared  with ECG of 22-AUG-2024 08:45, (unconfirmed) No significant change was found Confirmed by Jose De Anda ([id number]) on 8/22/2024 2:47:19 PM    ECG 12 lead    Result Date: 8/22/2024  Atrial fibrillation Abnormal ECG When compared with ECG of 02-OCT-2018 11:12, Atrial fibrillation has replaced Sinus rhythm Confirmed by Jose De Anda ([id number]) on 8/22/2024 2:44:26 PM    ECG 12 lead (Clinic Performed)    Result Date: 8/15/2024  See scanned document              Clinical impression:  1.  Persistent atrial fibrillation on beta-blockade, magnesium oxide, and anticoagulated with Eliquis.  2.  Atypical chest pain.  Lexiscan stress test April 4, 2013 revealing no evidence of acute ischemic changes or infarct patterns with an ejection fraction of 46%.  3.  RAKAN dated August 22, 2024 revealing normal left ventricular function and 1-2+ MR.  4.  Carotid duplex scan dated May 24, 2024 revealing less than 50% bilateral internal carotid artery stenosis.  5.  Pulmonary hypertension, right ventricular systolic pressure 31.9 mmHg per 2D echocardiogram dated April 24, 2024.  6.  Class I obesity, BMI 34.92.  7.  Hypertension, controlled, blood pressure 108/72.  8.  Obstructive sleep apnea on CPAP  9.  Remote tobacco use.     Plan:  EKG, labs, and all pertinent clinical documentation reviewed personally by me prior to initiation of antiarrhythmic drug load  Antiarrhythmic drug load with sotalol-begin sotalol 80 mg twice daily  Continue atenolol 25 mg daily  Continue anticoagulation with Eliquis  Daily labs and EKG  Continue to monitor on telemetry  Supplemental potassium to keep K over 4 and supplemental magnesium to keep mag over 2  Cardioversion on Friday, 9/6/2024 with Dr. Riley if remains in atrial fibrillation  Eventual PVI with Dr. Riley as outpatient  Nuclear stress test for ischemic evaluation with complaints of atypical chest pain as outpatient later this month  Lengthy discussion with patient and family (wife) regarding atrial  fibrillation, disease process, PVI, antiarrhythmic medications, and lifestyle modifications including avoiding caffeine and alcohol, increasing water intake and avoiding dehydration.           I spent 75 minutes in the professional and overall care of this patient.      Nydia Medellin, RAFY-CNP

## 2024-09-04 NOTE — CARE PLAN
The patient's goals for the shift include      The clinical goals for the shift include patient will remain hemodynamically stable

## 2024-09-05 ENCOUNTER — APPOINTMENT (OUTPATIENT)
Dept: CARDIOLOGY | Facility: HOSPITAL | Age: 74
End: 2024-09-05
Payer: COMMERCIAL

## 2024-09-05 ENCOUNTER — ANESTHESIA EVENT (OUTPATIENT)
Dept: CARDIOLOGY | Facility: HOSPITAL | Age: 74
End: 2024-09-05
Payer: COMMERCIAL

## 2024-09-05 LAB
ATRIAL RATE: 81 BPM
ATRIAL RATE: 85 BPM
Q ONSET: 220 MS
Q ONSET: 220 MS
QRS COUNT: 14 BEATS
QRS COUNT: 15 BEATS
QRS DURATION: 90 MS
QRS DURATION: 94 MS
QT INTERVAL: 366 MS
QT INTERVAL: 374 MS
QTC CALCULATION(BAZETT): 439 MS
QTC CALCULATION(BAZETT): 442 MS
QTC FREDERICIA: 416 MS
QTC FREDERICIA: 417 MS
R AXIS: 1 DEGREES
R AXIS: 7 DEGREES
T AXIS: 5 DEGREES
T AXIS: 9 DEGREES
T OFFSET: 403 MS
T OFFSET: 407 MS
VENTRICULAR RATE: 83 BPM
VENTRICULAR RATE: 88 BPM

## 2024-09-05 PROCEDURE — 93005 ELECTROCARDIOGRAM TRACING: CPT

## 2024-09-05 PROCEDURE — 2500000001 HC RX 250 WO HCPCS SELF ADMINISTERED DRUGS (ALT 637 FOR MEDICARE OP): Performed by: INTERNAL MEDICINE

## 2024-09-05 PROCEDURE — 93010 ELECTROCARDIOGRAM REPORT: CPT | Performed by: INTERNAL MEDICINE

## 2024-09-05 PROCEDURE — 2500000001 HC RX 250 WO HCPCS SELF ADMINISTERED DRUGS (ALT 637 FOR MEDICARE OP): Performed by: NURSE PRACTITIONER

## 2024-09-05 PROCEDURE — 2060000001 HC INTERMEDIATE ICU ROOM DAILY

## 2024-09-05 PROCEDURE — 99233 SBSQ HOSP IP/OBS HIGH 50: CPT | Performed by: NURSE PRACTITIONER

## 2024-09-05 PROCEDURE — 2500000004 HC RX 250 GENERAL PHARMACY W/ HCPCS (ALT 636 FOR OP/ED): Performed by: NURSE PRACTITIONER

## 2024-09-05 RX ORDER — SODIUM CHLORIDE 9 MG/ML
10 INJECTION, SOLUTION INTRAVENOUS CONTINUOUS
Status: DISCONTINUED | OUTPATIENT
Start: 2024-09-06 | End: 2024-09-06

## 2024-09-05 RX ORDER — TALC
6 POWDER (GRAM) TOPICAL NIGHTLY PRN
Status: DISCONTINUED | OUTPATIENT
Start: 2024-09-05 | End: 2024-09-06 | Stop reason: HOSPADM

## 2024-09-05 SDOH — HEALTH STABILITY: MENTAL HEALTH: CURRENT SMOKER: 0

## 2024-09-05 ASSESSMENT — COGNITIVE AND FUNCTIONAL STATUS - GENERAL
MOBILITY SCORE: 22
DAILY ACTIVITIY SCORE: 24
WALKING IN HOSPITAL ROOM: A LITTLE
MOBILITY SCORE: 24
DAILY ACTIVITIY SCORE: 24
CLIMB 3 TO 5 STEPS WITH RAILING: A LITTLE

## 2024-09-05 ASSESSMENT — PAIN SCALES - GENERAL
PAINLEVEL_OUTOF10: 0 - NO PAIN

## 2024-09-05 ASSESSMENT — PAIN - FUNCTIONAL ASSESSMENT
PAIN_FUNCTIONAL_ASSESSMENT: 0-10

## 2024-09-05 ASSESSMENT — PAIN SCALES - WONG BAKER: WONGBAKER_NUMERICALRESPONSE: NO HURT

## 2024-09-05 NOTE — CARE PLAN
Problem: Skin  Goal: Decreased wound size/increased tissue granulation at next dressing change  Outcome: Progressing  Flowsheets (Taken 9/5/2024 1147)  Decreased wound size/increased tissue granulation at next dressing change: Promote sleep for wound healing  Goal: Participates in plan/prevention/treatment measures  Outcome: Progressing  Flowsheets (Taken 9/5/2024 1147)  Participates in plan/prevention/treatment measures:   Discuss with provider PT/OT consult   Elevate heels   Increase activity/out of bed for meals  Goal: Prevent/manage excess moisture  Outcome: Progressing  Flowsheets (Taken 9/5/2024 1147)  Prevent/manage excess moisture:   Cleanse incontinence/protect with barrier cream   Monitor for/manage infection if present   Follow provider orders for dressing changes  Goal: Prevent/minimize sheer/friction injuries  Outcome: Progressing  Flowsheets (Taken 9/5/2024 1147)  Prevent/minimize sheer/friction injuries:   Complete micro-shifts as needed if patient unable. Adjust patient position to relieve pressure points, not a full turn   Increase activity/out of bed for meals   Use pull sheet   HOB 30 degrees or less   Turn/reposition every 2 hours/use positioning/transfer devices  Goal: Promote/optimize nutrition  Outcome: Progressing  Flowsheets (Taken 9/5/2024 1147)  Promote/optimize nutrition:   Monitor/record intake including meals   Consume > 50% meals/supplements   Offer water/supplements/favorite foods  Goal: Promote skin healing  Outcome: Progressing  Flowsheets (Taken 9/5/2024 1147)  Promote skin healing:   Assess skin/pad under line(s)/device(s)   Protective dressings over bony prominences   Turn/reposition every 2 hours/use positioning/transfer devices   Ensure correct size (line/device) and apply per  instructions   Rotate device position/do not position patient on device

## 2024-09-05 NOTE — ANESTHESIA PREPROCEDURE EVALUATION
Venancio Cervantes is a 74 y.o. male here for:    Cardioversion External  With * No providers found *  Persistent atrial fibrillation (Multi)    Relevant Problems   Cardiac   (+) Atrial fibrillation (Multi)   (+) Atrial fibrillation, persistent (Multi)   (+) Atypical chest pain   (+) Essential hypertension   (+) Mitral valve regurgitation   (+) Mixed hyperlipidemia   (+) Nonrheumatic tricuspid valve regurgitation   (+) PVC (premature ventricular contraction)   (+) Persistent atrial fibrillation (Multi)      Neuro   (+) Anxiety   (+) Carotid artery stenosis      GI   (+) Irritable bowel syndrome      Musculoskeletal   (+) Knee osteoarthritis   (+) Osteoarthritis       Lab Results   Component Value Date    HGB 16.7 2024    HCT 50.0 2024    WBC 8.5 2024     2024     2024    K 4.5 2024     2024    CREATININE 0.99 2024    BUN 16 2024     RAKAN 2024:  CONCLUSIONS:   1. The left ventricular systolic function is normal, with a visually estimated ejection fraction of 60%.   2. Has follow up with Dr. Martinez. If recurrent a.fib will need to consider EP consult.   3. There is normal right ventricular global systolic function.   4. Mildly dilated LA/YAMIL.      No YAMIL/LA thrombus      Moderate spontaneous echo contrast LA/YAMIL      No right to left shunt.   5. 1-2+ MR with normal appearing MV, MR likely related to a.fib.   6. RVSP within normal limits.   7. Normal aortic valve.   8. The main pulmonary artery is normal in size, and position, with normal bifurcation into the left and right pulmonary arteries.   9. The pulmonary veins appear normal and return normally to the left atrium.  10. No left atrial thrombus.    Social History     Tobacco Use   Smoking Status Former    Current packs/day: 0.00    Average packs/day: 1 pack/day for 15.0 years (15.0 ttl pk-yrs)    Types: Cigarettes    Start date:     Quit date:     Years since quittin.7     Passive exposure: Past   Smokeless Tobacco Never       Allergies   Allergen Reactions    Doxycycline Other    Metoprolol Unknown    Silver Sulfadiazine Rash       Current Outpatient Medications   Medication Instructions    apixaban (ELIQUIS) 5 mg, oral, 2 times daily    atenolol (TENORMIN) 50 mg, oral, Daily    clotrimazole-betamethasone (Lotrisone) cream 1 Application, Topical, See admin instructions, Apply as directed by physician    coenzyme Q-10 100 mg, oral, Daily    latanoprost (Xalatan) 0.005 % ophthalmic solution 1 drop, ophthalmic (eye), Daily RT    losartan (COZAAR) 50 mg, oral, Daily    magnesium oxide (MAG-OX) 400 mg, oral, Daily    omeprazole (PRILOSEC) 40 mg, oral, Daily before breakfast, Do not crush or chew.    sildenafil (Viagra) 100 mg tablet take 1 tablet by mouth once daily 1 hour before needed       Past Surgical History:   Procedure Laterality Date    OTHER SURGICAL HISTORY  11/11/2021    Cardioversion    OTHER SURGICAL HISTORY  11/11/2021    Ankle surgery    OTHER SURGICAL HISTORY  11/11/2021    Complete colonoscopy    OTHER SURGICAL HISTORY  11/11/2021    Rotator cuff repair    OTHER SURGICAL HISTORY  11/11/2021    Knee replacement    OTHER SURGICAL HISTORY  11/11/2021    Facial surgery       Family History   Problem Relation Name Age of Onset    Irregular heart beat Mother      Other (arteriosclerotic cardiovascular disease) Sister      Lung cancer Sister      Colon cancer Brother         NPO Details:  No data recorded    Physical Exam    Anesthesia Plan    History of general anesthesia?: yes  History of complications of general anesthesia?: no    ASA 3     MAC     The patient is not a current smoker.    intravenous induction   Anesthetic plan and risks discussed with patient.    Plan discussed with CRNA.

## 2024-09-05 NOTE — PROGRESS NOTES
Cardiology Progress Note  Patient: Venancio Cervantes  Unit/Bed: 811/811-A  YOB: 1950  MRN: [id number]  Acct: [id number]   Admitting Diagnosis:   Persistent atrial fibrillation (Multi) [I48.19]  Atrial fibrillation, persistent (Multi) [I48.19]  Date:  9/4/2024  Hospital Day: 1  Attending: Bobby Riley MD   Rounding RADHA/Cardiologist:  RAFY Werner-CNP,        Primary Cardiologist:  Dr Riley       Complaint:  No chief complaint on file.       SUBJECTIVE    Patient up out of bed ambulating in room  Remains in atrial fibrillation with heart rate 's  Denies palpitations, dizziness, or lightheadedness        VITALS   Visit Vitals  /74   Pulse 93   Temp 36.5 °C (97.7 °F)   Resp 17        I/O:    Intake/Output Summary (Last 24 hours) at 9/5/2024 1750  Last data filed at 9/5/2024 1700  Gross per 24 hour   Intake 2080 ml   Output --   Net 2080 ml        Allergies:  Allergies   Allergen Reactions    Doxycycline Other    Metoprolol Unknown    Silver Sulfadiazine Rash        PHYSICAL EXAM   Physical Exam  Constitutional:       Appearance: Normal appearance.   HENT:      Head: Normocephalic.      Nose: Nose normal.      Mouth/Throat:      Mouth: Mucous membranes are moist.   Eyes:      Pupils: Pupils are equal, round, and reactive to light.   Cardiovascular:      Rate and Rhythm: Normal rate. Rhythm irregular.      Pulses: Normal pulses.   Pulmonary:      Effort: Pulmonary effort is normal.      Breath sounds: Normal breath sounds.   Musculoskeletal:         General: Normal range of motion.   Skin:     General: Skin is warm and dry.   Neurological:      General: No focal deficit present.      Mental Status: He is alert and oriented to person, place, and time.   Psychiatric:         Mood and Affect: Mood normal.         Behavior: Behavior normal.           LABS     Results for orders placed or performed during the hospital encounter of 09/04/24 (from the past 24 hour(s))   ECG 12 lead   Result  Value Ref Range    Ventricular Rate 87 BPM    Atrial Rate 73 BPM    QRS Duration 94 ms    QT Interval 358 ms    QTC Calculation(Bazett) 430 ms    R Axis -2 degrees    T Axis 12 degrees    QRS Count 14 beats    Q Onset 219 ms    T Offset 398 ms    QTC Fredericia 405 ms   ECG 12 Lead   Result Value Ref Range    Ventricular Rate 83 BPM    Atrial Rate 85 BPM    QRS Duration 94 ms    QT Interval 374 ms    QTC Calculation(Bazett) 439 ms    R Axis 1 degrees    T Axis 5 degrees    QRS Count 14 beats    Q Onset 220 ms    T Offset 407 ms    QTC Fredericia 417 ms        Scheduled medications  apixaban, 5 mg, oral, BID  atenolol, 25 mg, oral, Daily  latanoprost, 1 drop, Both Eyes, Daily  losartan, 100 mg, oral, Daily  magnesium oxide, 400 mg, oral, Daily  pantoprazole, 40 mg, oral, Daily before breakfast  sotalol, 80 mg, oral, BID      Continuous medications  [START ON 9/6/2024] sodium chloride 0.9%, 10 mL/hr      PRN medications  PRN medications: acetaminophen     ECG 12 Lead    Result Date: 9/5/2024  Atrial fibrillation Abnormal ECG When compared with ECG of 04-SEP-2024 18:55, (unconfirmed) No significant change was found    ECG 12 lead    Result Date: 9/4/2024  Atrial fibrillation Abnormal ECG When compared with ECG of 04-SEP-2024 14:35, (unconfirmed) Nonspecific T wave abnormality now evident in Inferior leads QT has lengthened    ECG 12 Lead    Result Date: 9/4/2024  Atrial fibrillation Abnormal ECG When compared with ECG of 22-AUG-2024 10:53, T wave inversion no longer evident in Inferior leads QT has shortened    ECG 12 lead (Clinic Performed)    Result Date: 8/29/2024  See scanned document    Transesophageal Echo (RAKAN) With Possible Cardioversion    Result Date: 8/22/2024          [place] [id number]  Tel [phone] Fax [phone] TRANSESOPHAGEAL ECHOCARDIOGRAM REPORT Patient Name:      SEJAL CORCORAN CARITO     Reading Physician:    [id number] Arvizu                                                                MD, [facility] Study Date:        8/22/2024            Ordering Provider:    [id number] FERN RUSSELL MRN/PID:           [id number]             Fellow: Accession#:        [id number]         Nurse:                Roderick Clarke RN Date of Birth/Age: 1950 / 74 years  Sonographer:          Cesia Bradley RDCS Gender:            M                    Additional Staff: Height:            187.96 cm            Admit Date: Weight:            121.56 kg            Admission Status:     Outpatient BSA / BMI:         2.46 m2 / 34.41      Department Location:  [address] [place] [address]                    kg/m2 Blood Pressure: 110 /73 mmHg Study Type:    TRANSESOPHAGEAL ECHO (RAKAN) W/ POSSIBLE CARDIOVERSION Diagnosis/ICD: Other persistent AFib-I48.19 Indication:    A.Fib CPT Codes:     RAKAN Complete-31142; Moderate Sedation Services initial 15 minutes                patient >5 years-[id number]  Study Detail: The following Echo studies were performed: 2D, Doppler and color               flow. Agitated saline used as a contrast agent for intraseptal               flow evaluation.  PHYSICIAN INTERPRETATION: RAKAN Details: The RAKAN probe used was [id number]. Technically adequate omniplane transesophageal echocardiogram performed. Agitated saline contrast and color flow Doppler echo was performed to assess for the presence of a patent foramen ovale. RAKAN Medication: The pharynx was anesthetized with Cetacaine spray and viscous lidocaine. The patient was sedated using moderate sedation. Midazolam and Fentanyl was used to sedate the patient for this exam. RAKAN Procedure: The probe was passed without difficulty. The following complication was encountered during the procedure: Patient tolerated the procedure well without any apparent complications. RAKAN Cardioversion Procedure: The patient was placed in a supine position and anterior-posterior defibrillation patches were applied. A  biphasic defibrillator was attached to the patient and set to synchronous mode. Sedation for the cardioversion was achieved using Propofol. The patient has been anticoagulated with: Eliquis. One synchronized biphasic external shock was delivered externally at 200 Joules in attempt to cardiovert the patient from atrial fibrillation. Has follow up with Dr. Martinez. If recurrent a.fib will need to consider EP consult. Left Ventricle: The left ventricular systolic function is normal, with a visually estimated ejection fraction of 60%. There are no regional wall motion abnormalities. The left ventricular cavity size is normal. Left ventricular diastolic filling was not assessed. Left Atrium: The left atrium is mildly dilated. There is no definite left atrial thrombus present. A bubble study using agitated saline was performed. Bubble study is negative. The left atrial appendage is enlarged and there is no thrombus visualized in the left atrial appendage. There is a moderate degree of spontaneous echo contrast in the left atrium. Mildly dilated LA/YAMIL. No YAMIL/LA thrombus Moderate spontaneous echo contrast LA/YAMIL No right to left shunt. Right Ventricle: The right ventricle is normal in size. There is normal right ventricular global systolic function. Right Atrium: The right atrium is normal in size. Aortic Valve: The aortic valve is trileaflet. There is no evidence of aortic valve regurgitation. Normal aortic valve. Mitral Valve: The mitral valve is normal in structure. There is mild mitral valve regurgitation. 1-2+ MR with normal appearing MV, MR likely related to a.fib. Tricuspid Valve: The tricuspid valve is structurally normal. There is mild tricuspid regurgitation. The Doppler estimated RVSP is within normal limits at 18.1 mmHg. Pulmonic Valve: The pulmonic valve is structurally normal. There is no indication of pulmonic valve regurgitation. Pericardium: There is no pericardial effusion noted. Aorta: The aortic  root is normal. Pulmonary Artery: The main pulmonary artery is normal in size, and position, with normal bifurcation into the left and right pulmonary arteries. Pulmonary Veins: The pulmonary veins appear normal and return normally to the left atrium.  CONCLUSIONS:  1. The left ventricular systolic function is normal, with a visually estimated ejection fraction of 60%.  2. Has follow up with Dr. Martinez. If recurrent a.fib will need to consider EP consult.  3. There is normal right ventricular global systolic function.  4. Mildly dilated LA/YAMIL.     No YAMIL/LA thrombus     Moderate spontaneous echo contrast LA/YAMIL     No right to left shunt.  5. 1-2+ MR with normal appearing MV, MR likely related to a.fib.  6. RVSP within normal limits.  7. Normal aortic valve.  8. The main pulmonary artery is normal in size, and position, with normal bifurcation into the left and right pulmonary arteries.  9. The pulmonary veins appear normal and return normally to the left atrium. 10. No left atrial thrombus. RECOMMENDATIONS: Recommendations for this patient include anticoagulation and beta blockade.  QUANTITATIVE DATA SUMMARY: LV SYSTOLIC FUNCTION BY 2D PLANIMETRY (MOD):                      Normal Ranges: EF-Visual:      60 % LV EF Reported: 60 % TRICUSPID VALVE/RVSP:                             Normal Ranges: Peak TR Velocity: 1.94 m/s RV Syst Pressure: 18.1 mmHg (< 30mmHg)  [id number] Gallo Arvizu MD, [facility] Electronically signed on 8/22/2024 at 5:48:49 PM  ** Final **     ECG 12 lead    Result Date: 8/22/2024  Atrial fibrillation Abnormal ECG When compared with ECG of 22-AUG-2024 08:45, (unconfirmed) No significant change was found Confirmed by Jose De Anda ([id number]) on 8/22/2024 2:47:19 PM    ECG 12 lead    Result Date: 8/22/2024  Atrial fibrillation Abnormal ECG When compared with ECG of 02-OCT-2018 11:12, Atrial fibrillation has replaced Sinus rhythm Confirmed by Jose De Anda ([id number]) on 8/22/2024 2:44:26 PM    ECG 12  lead (Clinic Performed)    Result Date: 8/15/2024  See scanned document       Encounter Date: 09/04/24   ECG 12 Lead   Result Value    Ventricular Rate 83    Atrial Rate 85    QRS Duration 94    QT Interval 374    QTC Calculation(Bazett) 439    R Axis 1    T Axis 5    QRS Count 14    Q Onset 220    T Offset 407    QTC Fredericia 417    Narrative    Atrial fibrillation  Abnormal ECG  When compared with ECG of 04-SEP-2024 18:55, (unconfirmed)  No significant change was found        Tele Monitoring:atrial fibrillation 's/ EKG shows atrial fibrillation heart rate 83 bpm and QTc 439 ms    Assessment     Patient Active Problem List   Diagnosis    Hypogonadism male    Erectile dysfunction    Essential hypertension    Eustachian tube dysfunction    Foot pain    Globus pharyngeus    Diminished libido    Contact dermatitis    Carotid artery stenosis    Atrial fibrillation (Multi)    Arthritis of carpometacarpal (CMC) joint of thumb    Anxiety    Allergic contact dermatitis due to adhesives    Irritable bowel syndrome    Itching    Knee osteoarthritis    Osteoarthritis    Lateral cyst    Obesity (BMI 30-39.9)    Paraspinal muscle spasm    Periocular dermoid cyst of right eye    PVC (premature ventricular contraction)    S/P TKR (total knee replacement)    Skin lesion    Skin neoplasm    Sleep apnea    Vitamin D deficiency    Left ear pain    Nonrheumatic tricuspid valve regurgitation    Mitral valve regurgitation    Mixed hyperlipidemia    Dizziness    BMI 36.0-36.9,adult    Former smoker    Atypical chest pain    Persistent atrial fibrillation (Multi)    Atrial fibrillation, persistent (Multi)    Clinical impression:  1.  Persistent atrial fibrillation on beta-blockade, magnesium oxide, and anticoagulated with Eliquis.  2.  Atypical chest pain.  Lexiscan stress test April 4, 2013 revealing no evidence of acute ischemic changes or infarct patterns with an ejection fraction of 46%.  3.  RAKAN dated August 22, 2024 revealing  normal left ventricular function and 1-2+ MR.  4.  Carotid duplex scan dated May 24, 2024 revealing less than 50% bilateral internal carotid artery stenosis.  5.  Pulmonary hypertension, right ventricular systolic pressure 31.9 mmHg per 2D echocardiogram dated April 24, 2024.  6.  Class I obesity, BMI 34.92.  7.  Hypertension, controlled, blood pressure 108/72.  8.  Obstructive sleep apnea on CPAP  9.  Remote tobacco use.      Plan:  EKG and labs personally reviewed by me  Continue sotalol 80 mg twice daily  Continue atenolol 25 mg daily  Continue anticoagulation with Eliquis  Daily labs and EKG  Continue to monitor on telemetry through 5 doses  Supplemental potassium to keep K over 4 and supplemental magnesium to keep mag over 2  Cardioversion on Friday, 9/6/2024 with Dr. Riley patient remains in atrial fibrillation       Electronically signed by EDWARD Werner on 9/5/2024 at 5:50 PM

## 2024-09-06 ENCOUNTER — APPOINTMENT (OUTPATIENT)
Dept: CARDIOLOGY | Facility: HOSPITAL | Age: 74
End: 2024-09-06
Payer: COMMERCIAL

## 2024-09-06 ENCOUNTER — ANESTHESIA (OUTPATIENT)
Dept: CARDIOLOGY | Facility: HOSPITAL | Age: 74
End: 2024-09-06
Payer: COMMERCIAL

## 2024-09-06 ENCOUNTER — HOSPITAL ENCOUNTER (INPATIENT)
Dept: CARDIOLOGY | Facility: HOSPITAL | Age: 74
Discharge: HOME | End: 2024-09-06
Payer: COMMERCIAL

## 2024-09-06 VITALS
DIASTOLIC BLOOD PRESSURE: 57 MMHG | TEMPERATURE: 97.3 F | SYSTOLIC BLOOD PRESSURE: 118 MMHG | OXYGEN SATURATION: 92 % | HEART RATE: 58 BPM

## 2024-09-06 VITALS
WEIGHT: 263.23 LBS | HEART RATE: 90 BPM | DIASTOLIC BLOOD PRESSURE: 79 MMHG | BODY MASS INDEX: 33.78 KG/M2 | RESPIRATION RATE: 19 BRPM | TEMPERATURE: 97.5 F | HEIGHT: 74 IN | OXYGEN SATURATION: 98 % | SYSTOLIC BLOOD PRESSURE: 118 MMHG

## 2024-09-06 LAB
ATRIAL RATE: 156 BPM
ATRIAL RATE: 64 BPM
ATRIAL RATE: 67 BPM
BODY SURFACE AREA: 2.5 M2
P AXIS: 32 DEGREES
P AXIS: 59 DEGREES
P OFFSET: 177 MS
P OFFSET: 187 MS
P ONSET: 114 MS
P ONSET: 117 MS
PR INTERVAL: 204 MS
PR INTERVAL: 208 MS
Q ONSET: 216 MS
Q ONSET: 217 MS
Q ONSET: 221 MS
QRS COUNT: 10 BEATS
QRS COUNT: 11 BEATS
QRS COUNT: 15 BEATS
QRS DURATION: 104 MS
QRS DURATION: 92 MS
QRS DURATION: 98 MS
QT INTERVAL: 378 MS
QT INTERVAL: 430 MS
QT INTERVAL: 434 MS
QTC CALCULATION(BAZETT): 443 MS
QTC CALCULATION(BAZETT): 458 MS
QTC CALCULATION(BAZETT): 459 MS
QTC FREDERICIA: 430 MS
QTC FREDERICIA: 439 MS
QTC FREDERICIA: 450 MS
R AXIS: 19 DEGREES
R AXIS: 2 DEGREES
R AXIS: 20 DEGREES
T AXIS: -2 DEGREES
T AXIS: 12 DEGREES
T AXIS: 9 DEGREES
T OFFSET: 406 MS
T OFFSET: 433 MS
T OFFSET: 436 MS
VENTRICULAR RATE: 64 BPM
VENTRICULAR RATE: 67 BPM
VENTRICULAR RATE: 89 BPM

## 2024-09-06 PROCEDURE — 92960 CARDIOVERSION ELECTRIC EXT: CPT

## 2024-09-06 PROCEDURE — 2500000004 HC RX 250 GENERAL PHARMACY W/ HCPCS (ALT 636 FOR OP/ED): Performed by: REGISTERED NURSE

## 2024-09-06 PROCEDURE — 93005 ELECTROCARDIOGRAM TRACING: CPT

## 2024-09-06 PROCEDURE — 3700000001 HC GENERAL ANESTHESIA TIME - INITIAL BASE CHARGE: Performed by: INTERNAL MEDICINE

## 2024-09-06 PROCEDURE — 7100000001 HC RECOVERY ROOM TIME - INITIAL BASE CHARGE: Performed by: INTERNAL MEDICINE

## 2024-09-06 PROCEDURE — 2500000004 HC RX 250 GENERAL PHARMACY W/ HCPCS (ALT 636 FOR OP/ED): Performed by: NURSE PRACTITIONER

## 2024-09-06 PROCEDURE — 99239 HOSP IP/OBS DSCHRG MGMT >30: CPT | Performed by: NURSE PRACTITIONER

## 2024-09-06 PROCEDURE — 93010 ELECTROCARDIOGRAM REPORT: CPT | Performed by: INTERNAL MEDICINE

## 2024-09-06 PROCEDURE — 92960 CARDIOVERSION ELECTRIC EXT: CPT | Performed by: INTERNAL MEDICINE

## 2024-09-06 PROCEDURE — 3700000002 HC GENERAL ANESTHESIA TIME - EACH INCREMENTAL 1 MINUTE: Performed by: INTERNAL MEDICINE

## 2024-09-06 PROCEDURE — 2500000001 HC RX 250 WO HCPCS SELF ADMINISTERED DRUGS (ALT 637 FOR MEDICARE OP): Performed by: NURSE PRACTITIONER

## 2024-09-06 PROCEDURE — 5A2204Z RESTORATION OF CARDIAC RHYTHM, SINGLE: ICD-10-PCS | Performed by: INTERNAL MEDICINE

## 2024-09-06 PROCEDURE — 7100000009 HC PHASE TWO TIME - INITIAL BASE CHARGE: Performed by: INTERNAL MEDICINE

## 2024-09-06 PROCEDURE — 7100000002 HC RECOVERY ROOM TIME - EACH INCREMENTAL 1 MINUTE: Performed by: INTERNAL MEDICINE

## 2024-09-06 PROCEDURE — 7100000010 HC PHASE TWO TIME - EACH INCREMENTAL 1 MINUTE: Performed by: INTERNAL MEDICINE

## 2024-09-06 RX ORDER — PROPOFOL 10 MG/ML
INJECTION, EMULSION INTRAVENOUS AS NEEDED
Status: DISCONTINUED | OUTPATIENT
Start: 2024-09-06 | End: 2024-09-06

## 2024-09-06 RX ORDER — ATENOLOL 25 MG/1
25 TABLET ORAL DAILY
Start: 2024-09-06

## 2024-09-06 RX ORDER — LOSARTAN POTASSIUM 100 MG/1
100 TABLET ORAL DAILY
Start: 2024-09-06

## 2024-09-06 RX ORDER — SOTALOL HYDROCHLORIDE 80 MG/1
80 TABLET ORAL 2 TIMES DAILY
Qty: 60 TABLET | Refills: 5 | Status: SHIPPED | OUTPATIENT
Start: 2024-09-06 | End: 2025-03-05

## 2024-09-06 ASSESSMENT — PAIN SCALES - GENERAL
PAINLEVEL_OUTOF10: 0 - NO PAIN
PAIN_LEVEL: 0

## 2024-09-06 NOTE — DISCHARGE SUMMARY
Discharge Diagnosis  Persistent atrial fibrillation (Multi)    Issues Requiring Follow-Up  Antiarrhythmic drug load with sotalol/PVI in future    Test Results Pending At Discharge  Pending Labs       No current pending labs.            Hospital Course   Venancio Cervantes is a 74 y.o. male presenting with Persistent atrial fibrillation for antiarrhythmic drug load with sotalol.  Patient has valva paroxysmal atrial fibrillation trending towards persistent atrial fib maintained on metoprolol, magnesium oxide and anticoagulation with Eliquis.  Patient was first diagnosed approximately 30 years ago with atrial fibrillation.  Patient has fatigue however denies chest pain, palpitations, or dizziness.  He does experience shortness of breath and lightheadedness with exertion.  Patient is followed with Dr. RICH since April 4, 2013.  Did undergo a Lexiscan stress test April 4, 2013 which revealed LVEF 46% and no acute ischemic changes or infarct patterns.  Patient underwent a RAKAN guided cardioversion August 22, 2024 with restoration of sinus rhythm.  Patient believes he went into atrial for within 24 hours after the procedure.  The RAKAN revealed normal LVEF with 1-2+ MR and borderline left atrial enlargement.  Other past medical history includes hypertension, obstructive sleep apnea on CPAP, remote tobacco use, and class I obesity with BMI of 33.78.  Patient also with a significant family history of paroxysmal atrial fibrillation/persistent atrial fibrillation with his mother and 2 sisters having atrial fib.  On admission patient was noted to be in atrial fibrillation.  He was initiated on sotalol 80 mg twice daily and continued on all his home medications.  Patient tolerated sotalol without complaint and on 9/6/2024 underwent cardioversion with Dr. Riley.  EKG showed sinus rhythm heart rate 64 bpm and QTc 443 ms.  Patient will discharge home this evening and follow-up with a nuclear stress test in 2 to 3 weeks and then   Osvaldo.  Plan for future PVI after seeing Dr. Riley in the office.    Pertinent Physical Exam At Time of Discharge  Physical Exam  Constitutional:       Appearance: Normal appearance.   HENT:      Head: Normocephalic.   Eyes:      Conjunctiva/sclera: Conjunctivae normal.   Cardiovascular:      Rate and Rhythm: Normal rate and regular rhythm.      Pulses: Normal pulses.      Heart sounds: Normal heart sounds.   Pulmonary:      Effort: Pulmonary effort is normal.      Breath sounds: Normal breath sounds.   Musculoskeletal:         General: Normal range of motion.   Skin:     General: Skin is warm and dry.   Neurological:      General: No focal deficit present.      Mental Status: He is alert and oriented to person, place, and time.   Psychiatric:         Mood and Affect: Mood normal.         Behavior: Behavior normal.         Home Medications     Medication List      START taking these medications     sotalol 80 mg tablet; Commonly known as: Betapace; Take 1 tablet (80 mg)   by mouth 2 times a day.     CONTINUE taking these medications     atenolol 25 mg tablet; Commonly known as: Tenormin; Take 1 tablet (25   mg) by mouth once daily.   clotrimazole-betamethasone cream; Commonly known as: Lotrisone   coenzyme Q-10 100 mg capsule   Eliquis 5 mg tablet; Generic drug: apixaban   latanoprost 0.005 % ophthalmic solution; Commonly known as: Xalatan   losartan 100 mg tablet; Commonly known as: Cozaar; Take 1 tablet (100   mg) by mouth once daily.   magnesium oxide 400 mg (241.3 mg magnesium) tablet; Commonly known as:   Mag-Ox; Take 1 tablet (400 mg) by mouth once daily.   omeprazole 40 mg DR capsule; Commonly known as: PriLOSEC   sildenafil 100 mg tablet; Commonly known as: Viagra; take 1 tablet by   mouth once daily 1 hour before needed       Outpatient Follow-Up  Future Appointments   Date Time Provider Department Center   9/24/2024  8:00 AM E[facility]AL [facility] ADMIN ROOM 1 [facility]   9/24/2024  8:30 AM EL[facility]  [id number] STRESS ROOM 1 [id number] [place]   9/24/2024  9:00 AM [facility]Y [facility] NM 2 ELYGtMNM [place] [facility]   9/25/2024  9:00 AM [facility]Y [facility] ADMIN ROOM 1 EL[facility]tMNM [place] [facility]   9/25/2024  9:15 AM [facility]Y [facility] NM 2 EL[facility]GtMNM [place] [facility]   10/8/2024 11:15 AM Bobby Riley MD [id number] [place]   11/11/2024  9:00 AM Obed Ivory MD [id number] [place]   Total discharge time 40 minutes    Nydia Medellin, APRN-CNP

## 2024-09-06 NOTE — NURSING NOTE
Patient successfully cardioverted in [facility] CVIU.  Post cardioversion, focused assessment completed and WDL.  Patient will remain in CVIU for one hour post procedure then return to floor. Dr. Riley spoke with patient's wife on the phone and updated her on the procedure. Patient provided with water; no other needs at this time.

## 2024-09-06 NOTE — CARE PLAN
Problem: Skin  Goal: Decreased wound size/increased tissue granulation at next dressing change  Outcome: Progressing  Goal: Participates in plan/prevention/treatment measures  Outcome: Progressing  Goal: Prevent/manage excess moisture  Outcome: Progressing  Goal: Prevent/minimize sheer/friction injuries  Outcome: Progressing  Goal: Promote/optimize nutrition  Outcome: Progressing  Goal: Promote skin healing  Outcome: Progressing   The patient's goals for the shift include      The clinical goals for the shift include patient will remain HDS throughout shift.

## 2024-09-06 NOTE — NURSING NOTE
Patient returning to 8S post cardioversion recovery. Report given to Ashlie RANDALL and patient placed in Patient Movement for transport back to floor.

## 2024-09-06 NOTE — CARE PLAN
Problem: Skin  Goal: Decreased wound size/increased tissue granulation at next dressing change  Outcome: Progressing  Goal: Participates in plan/prevention/treatment measures  Outcome: Progressing  Goal: Prevent/manage excess moisture  Outcome: Progressing  Goal: Prevent/minimize sheer/friction injuries  Outcome: Progressing  Goal: Promote/optimize nutrition  Outcome: Progressing  Goal: Promote skin healing  Outcome: Progressing     Problem: Safety - Adult  Goal: Free from fall injury  Outcome: Progressing     Problem: Discharge Planning  Goal: Discharge to home or other facility with appropriate resources  Outcome: Progressing     Problem: Chronic Conditions and Co-morbidities  Goal: Patient's chronic conditions and co-morbidity symptoms are monitored and maintained or improved  Outcome: Progressing

## 2024-09-06 NOTE — ANESTHESIA POSTPROCEDURE EVALUATION
Patient: Venancio Cervantes    Procedure Summary       Date: 09/06/24 Room / Location: [facility]    Anesthesia Start: 0820 Anesthesia Stop: 0833    Procedure: CARDIOVERSION EXTERNAL Diagnosis: Persistent atrial fibrillation (Multi)    Scheduled Providers: Bobby Riley MD Responsible Provider: Alexis Aguilar MD    Anesthesia Type: MAC ASA Status: 3            Anesthesia Type: MAC    Vitals Value Taken Time   BP 95/71 09/06/24 0837   Temp 36 09/06/24 0837   Pulse 68 09/06/24 0837   Resp 16 09/06/24 0837   SpO2 96 % 09/06/24 0823       Anesthesia Post Evaluation    Patient location during evaluation: bedside  Patient participation: complete - patient participated  Level of consciousness: awake and alert  Pain score: 0  Pain management: adequate  Airway patency: patent  Cardiovascular status: acceptable, blood pressure returned to baseline, hemodynamically stable and stable  Respiratory status: acceptable, nonlabored ventilation, spontaneous ventilation and room air  Hydration status: acceptable  Postoperative Nausea and Vomiting: none        There were no known notable events for this encounter.

## 2024-09-09 LAB
ATRIAL RATE: 156 BPM
ATRIAL RATE: 326 BPM
ATRIAL RATE: 64 BPM
ATRIAL RATE: 67 BPM
ATRIAL RATE: 73 BPM
ATRIAL RATE: 81 BPM
ATRIAL RATE: 85 BPM
P AXIS: 32 DEGREES
P AXIS: 59 DEGREES
P OFFSET: 177 MS
P OFFSET: 187 MS
P ONSET: 114 MS
P ONSET: 117 MS
PR INTERVAL: 204 MS
PR INTERVAL: 208 MS
Q ONSET: 216 MS
Q ONSET: 217 MS
Q ONSET: 219 MS
Q ONSET: 220 MS
Q ONSET: 221 MS
QRS COUNT: 10 BEATS
QRS COUNT: 11 BEATS
QRS COUNT: 14 BEATS
QRS COUNT: 14 BEATS
QRS COUNT: 15 BEATS
QRS COUNT: 15 BEATS
QRS COUNT: 16 BEATS
QRS DURATION: 104 MS
QRS DURATION: 90 MS
QRS DURATION: 92 MS
QRS DURATION: 92 MS
QRS DURATION: 94 MS
QRS DURATION: 94 MS
QRS DURATION: 98 MS
QT INTERVAL: 302 MS
QT INTERVAL: 358 MS
QT INTERVAL: 366 MS
QT INTERVAL: 374 MS
QT INTERVAL: 378 MS
QT INTERVAL: 430 MS
QT INTERVAL: 434 MS
QTC CALCULATION(BAZETT): 381 MS
QTC CALCULATION(BAZETT): 430 MS
QTC CALCULATION(BAZETT): 439 MS
QTC CALCULATION(BAZETT): 442 MS
QTC CALCULATION(BAZETT): 443 MS
QTC CALCULATION(BAZETT): 458 MS
QTC CALCULATION(BAZETT): 459 MS
QTC FREDERICIA: 353 MS
QTC FREDERICIA: 405 MS
QTC FREDERICIA: 416 MS
QTC FREDERICIA: 417 MS
QTC FREDERICIA: 430 MS
QTC FREDERICIA: 439 MS
QTC FREDERICIA: 450 MS
R AXIS: -2 DEGREES
R AXIS: 1 DEGREES
R AXIS: 19 DEGREES
R AXIS: 2 DEGREES
R AXIS: 20 DEGREES
R AXIS: 38 DEGREES
R AXIS: 7 DEGREES
T AXIS: -2 DEGREES
T AXIS: 12 DEGREES
T AXIS: 12 DEGREES
T AXIS: 47 DEGREES
T AXIS: 5 DEGREES
T AXIS: 9 DEGREES
T AXIS: 9 DEGREES
T OFFSET: 371 MS
T OFFSET: 398 MS
T OFFSET: 403 MS
T OFFSET: 406 MS
T OFFSET: 407 MS
T OFFSET: 433 MS
T OFFSET: 436 MS
VENTRICULAR RATE: 64 BPM
VENTRICULAR RATE: 67 BPM
VENTRICULAR RATE: 83 BPM
VENTRICULAR RATE: 87 BPM
VENTRICULAR RATE: 88 BPM
VENTRICULAR RATE: 89 BPM
VENTRICULAR RATE: 96 BPM

## 2024-09-24 ENCOUNTER — HOSPITAL ENCOUNTER (OUTPATIENT)
Dept: RADIOLOGY | Facility: CLINIC | Age: 74
Discharge: HOME | End: 2024-09-24
Payer: COMMERCIAL

## 2024-09-24 ENCOUNTER — HOSPITAL ENCOUNTER (OUTPATIENT)
Dept: CARDIOLOGY | Facility: CLINIC | Age: 74
Discharge: HOME | End: 2024-09-24
Payer: COMMERCIAL

## 2024-09-24 DIAGNOSIS — R07.89 ATYPICAL CHEST PAIN: ICD-10-CM

## 2024-09-24 DIAGNOSIS — E78.2 MIXED HYPERLIPIDEMIA: ICD-10-CM

## 2024-09-24 DIAGNOSIS — I48.19 PERSISTENT ATRIAL FIBRILLATION (MULTI): Primary | ICD-10-CM

## 2024-09-24 DIAGNOSIS — I48.0 PAROXYSMAL ATRIAL FIBRILLATION (MULTI): ICD-10-CM

## 2024-09-24 PROCEDURE — 3430000001 HC RX 343 DIAGNOSTIC RADIOPHARMACEUTICALS: Performed by: NURSE PRACTITIONER

## 2024-09-24 PROCEDURE — 2500000004 HC RX 250 GENERAL PHARMACY W/ HCPCS (ALT 636 FOR OP/ED): Performed by: NURSE PRACTITIONER

## 2024-09-24 PROCEDURE — A9502 TC99M TETROFOSMIN: HCPCS | Performed by: NURSE PRACTITIONER

## 2024-09-24 PROCEDURE — 93017 CV STRESS TEST TRACING ONLY: CPT

## 2024-09-24 PROCEDURE — 78452 HT MUSCLE IMAGE SPECT MULT: CPT

## 2024-09-24 RX ORDER — REGADENOSON 0.08 MG/ML
0.4 INJECTION, SOLUTION INTRAVENOUS ONCE
Status: COMPLETED | OUTPATIENT
Start: 2024-09-24 | End: 2024-09-24

## 2024-09-24 NOTE — RESULT ENCOUNTER NOTE
Call patient with normal lexiscan stress test.  Continue current medications as prescribed and follow up as scheduled.

## 2024-09-25 ENCOUNTER — APPOINTMENT (OUTPATIENT)
Dept: RADIOLOGY | Facility: CLINIC | Age: 74
End: 2024-09-25
Payer: COMMERCIAL

## 2024-09-25 ENCOUNTER — TELEPHONE (OUTPATIENT)
Dept: CARDIOLOGY | Facility: CLINIC | Age: 74
End: 2024-09-25
Payer: COMMERCIAL

## 2024-09-25 NOTE — TELEPHONE ENCOUNTER
----- Message from Kavitha Garza sent at 9/24/2024  7:38 PM EDT -----  Call patient with normal lexiscan stress test.  Continue current medications as prescribed and follow up as scheduled.

## 2024-10-08 ENCOUNTER — OFFICE VISIT (OUTPATIENT)
Dept: CARDIOLOGY | Facility: CLINIC | Age: 74
End: 2024-10-08
Payer: COMMERCIAL

## 2024-10-08 VITALS
HEART RATE: 60 BPM | HEIGHT: 74 IN | TEMPERATURE: 96.1 F | RESPIRATION RATE: 14 BRPM | SYSTOLIC BLOOD PRESSURE: 132 MMHG | DIASTOLIC BLOOD PRESSURE: 72 MMHG | OXYGEN SATURATION: 97 % | WEIGHT: 265 LBS | BODY MASS INDEX: 34.01 KG/M2

## 2024-10-08 DIAGNOSIS — I48.19 PERSISTENT ATRIAL FIBRILLATION (MULTI): Primary | ICD-10-CM

## 2024-10-08 PROCEDURE — 3075F SYST BP GE 130 - 139MM HG: CPT | Performed by: INTERNAL MEDICINE

## 2024-10-08 PROCEDURE — 93010 ELECTROCARDIOGRAM REPORT: CPT | Performed by: INTERNAL MEDICINE

## 2024-10-08 PROCEDURE — 99215 OFFICE O/P EST HI 40 MIN: CPT | Performed by: INTERNAL MEDICINE

## 2024-10-08 PROCEDURE — 93005 ELECTROCARDIOGRAM TRACING: CPT | Performed by: INTERNAL MEDICINE

## 2024-10-08 PROCEDURE — 3078F DIAST BP <80 MM HG: CPT | Performed by: INTERNAL MEDICINE

## 2024-10-08 PROCEDURE — G2211 COMPLEX E/M VISIT ADD ON: HCPCS | Performed by: INTERNAL MEDICINE

## 2024-10-08 PROCEDURE — 1159F MED LIST DOCD IN RCRD: CPT | Performed by: INTERNAL MEDICINE

## 2024-10-10 NOTE — PROGRESS NOTES
Referring Provider: Bobby Riley MD  Reason for Consult: Persistent atrial fibrillation    History of Present Illness:      Venancio Cervantes is a 74 y.o. year old male patient with a history significant for persistent atrial fibrillation, hypertension, hyperlipidemia who is referred by Nica Aguilar for management of atrial fibrillation.    He was diagnosed approximately 30 years ago with atrial fibrillation.  His major complaint was fatigue at that time.  He was initially paroxysmal with very low burden of atrial fibrillation, but more recently has become persistent.  He underwent a RAKAN guided cardioversion in 2024 with restoration of sinus rhythm.  However, he went back into atrial fibrillation about 24 hours after the procedure.  In September, he was admitted to the hospital for a sotalol load, after which we cardioverted him back to normal sinus rhythm.  He is not interested in being on antiarrhythmic drugs long-term and is interested in ablation.    Focused Cardiovascular Problem List:  Persistent atrial fibrillation: [id number] equals 3, on Eliquis and sotalol.  Symptomatic.  Hypertension  Hyperlipidemia      Past Medical and Surgical History:  Mr. Cervantes  has a past medical history of Arrhythmia, Body mass index (BMI) 37.0-37.9, adult, Cancer (Multi), Hyperlipidemia, Hypertension, and Rash and other nonspecific skin eruption (2021).    has a past surgical history that includes Other surgical history (2021); Other surgical history (2021); Other surgical history (2021); Other surgical history (2021); Other surgical history (2021); and Other surgical history (2021).    Social History:  Social History     Tobacco Use    Smoking status: Former     Current packs/day: 0.00     Average packs/day: 1 pack/day for 15.0 years (15.0 ttl pk-yrs)     Types: Cigarettes     Start date:      Quit date:      Years since quittin.8     Passive  "exposure: Past    Smokeless tobacco: Never   Substance Use Topics    Alcohol use: Not Currently     Comment: 1x weekly        Drug use:  Denies      Relevant Family History:   Family History   Problem Relation Name Age of Onset    Irregular heart beat Mother      Other (arteriosclerotic cardiovascular disease) Sister      Lung cancer Sister      Colon cancer Brother          Allergies:  Allergies   Allergen Reactions    Doxycycline Other    Metoprolol Unknown    Silver Sulfadiazine Rash        Medications:  Current Outpatient Medications   Medication Instructions    apixaban (ELIQUIS) 5 mg, oral, 2 times daily    atenolol (TENORMIN) 25 mg, oral, Daily    clotrimazole-betamethasone (Lotrisone) cream 1 Application, Topical, See admin instructions, Apply as directed by physician    coenzyme Q-10 100 mg, oral, Daily    latanoprost (Xalatan) 0.005 % ophthalmic solution 1 drop, ophthalmic (eye), Daily RT    losartan (COZAAR) 100 mg, oral, Daily    magnesium oxide (MAG-OX) 400 mg, oral, Daily    omeprazole (PRILOSEC) 40 mg, oral, Daily before breakfast, Do not crush or chew.    sildenafil (Viagra) 100 mg tablet take 1 tablet by mouth once daily 1 hour before needed    sotalol (BETAPACE) 80 mg, oral, 2 times daily         Objective   Physical Exam:  Last Recorded Vitals:      9/5/2024     7:21 PM 9/6/2024    12:01 AM 9/6/2024     4:24 AM 9/6/2024    10:08 AM 9/6/2024    11:02 AM 9/6/2024     2:45 PM 10/8/2024    11:27 AM   Vitals   Systolic 115 116 118 109 106 118 132   Diastolic 77 77 79 68 70 57 72   Heart Rate 84 83 90 64 61 58 60   Temp 36.6 °C (97.9 °F) 36.1 °C (97 °F) 36.4 °C (97.5 °F) 36.3 °C (97.3 °F) 36.4 °C (97.5 °F) 36.3 °C (97.3 °F) 35.6 °C (96.1 °F)   Resp 19 19 19    14   Height (in)       1.88 m (6' 2\")   Weight (lb)       265   BMI       34.02 kg/m2   BSA (m2)       2.5 m2   Visit Report       Report    Visit Vitals  /72   Pulse 60   Temp 35.6 °C (96.1 °F)   Resp 14   Ht 1.88 m (6' 2\")   Wt 120 kg (265 " lb)   SpO2 97%   BMI 34.02 kg/m²   Smoking Status Former   BSA 2.5 m²      Gen: NAD, sitting comfortably  HEENT: NC/AT  Card: RRR, no m/r/g  Pulm: Clear to auscultation bilaterally  Ext: No LE edema  Neuro: No focal deficits    Diagnostic Results      My Interpretation of Reviewed Study(s):  Prior ECGs (reviewed and my interpretation):   10/8/2024: Sinus bradycardia, otherwise normal ECG    Echocardiography:  8/22/2024: No left atrial appendage thrombus, normal LV ejection fraction, no PFO, mild mitral valve regurgitation    Stress Test:   9/2024: Normal stress test without any perfusion defects        Relevant Labs:  Lab Results   Component Value Date    CREATININE 0.99 09/04/2024    CREATININE 1.09 08/15/2024    CREATININE 0.83 04/12/2023    K 4.5 09/04/2024    K 4.3 08/15/2024    K 4.2 04/12/2023    HGBA1C 5.5 07/03/2019    HGB 16.7 09/04/2024    HGB 16.9 08/22/2024    INR 1.4 (H) 09/04/2024    INR 1.5 (H) 08/22/2024    AST 14 09/04/2024    AST 13 04/12/2023    AST 18 07/03/2019    ALT 13 09/04/2024    ALT 12 04/12/2023    ALT 17 07/03/2019       Assessment/Plan   Assessment and Plan:  In summary, patient is a pleasant 74 y.o.male with a history of recurrent symptomatic drug-refractory persistent atrial fibrillation diagnosed in the 1990s, has been maintained on beta-blockers for a long time but now on sotalol, who presents today for initial consultation. Symptoms include fatigue and palpitations. Treatment options of atrial fibrillation were discussed with the patient and all questions were answered. These options included: 1. Rate control drug therapy with AVN blockers to slow down the heart rate, 2. Rhythm control with anti-arrhythmic drugs and with cardioversion to restore normal sinus rhythm, and/or 3. Radiofrequency (RF) ablation. In particular, RF ablation of atrial fibrillation was discussed in detail.    We discussed the potential benefits of AF ablation including freedom from AF without any medical  therapy or, in some cases, a significant improvement in AF burden on medical therapy. We also discussed that AF ablation has been shown to be the most effective way to maintain a normal rhythm. Normal rhythm is associated with less heart failure hospitalizations, development of congestive heart failure, major adverse cardiac events, and cognitive dysfunction.     We also discussed that ablation procedure is not indicated for the purpose of discontinuing oral anticoagulation, and the success rate for catheter ablation for AF, meaning no recurrence of any atrial fibrillation, is currently approximately 70-80% for paroxysmal atrial fibrillation. A minority of patients may require an additional touch-up procedure. However, it is highly successful at reducing overall A-fib burden as well as symptomatic recurrences. The risks of the procedure were also extensively discussed, including but not limited to infection, blood vessel damage, heart injury or perforation necessitating emergency cardiac surgery, heart attack or stroke, atrial-esophageal fistula, pulmonary vein stenosis, phrenic nerve injury, even death.      Finally, the risk of stroke associated with atrial fibrillation was discussed - as the patient has a [id number] score of  3, the patient will be maintained on apixaban for CVA prevention.    After extensive discussion, Mr. Cervantes wishes to proceed with atrial fibrillation ablation.  We will plan on continuing sotalol for 3 months after the ablation, after which we can stop and see how he does.  Flecainide may also be an antiarrhythmic option in the future.    Name: Venancio Cevrantes  MRN: [id number]  Attending: Bobby Riley MD  Procedure: Atrial fibrillation ablation  Date desired: As soon as possible  Diagnosis: Persistent atrial fibrillation  Vendor if applicable: [employer]  Expected anesthesia: General  Isolation/precautions?:  None  Other comments/med instructions: Continue apixaban uninterrupted  including on the day of the procedure.  Hold all other medications on the morning of the procedure.       Return to Clinic:  After ablation    Thank you very much for allowing me to participate in the care of this patient. Please do not hesitate to contact me with any further questions or concerns.    Bobby Riley MD  Clinical Cardiac Electrophysiologist, [facility] [place]    of Medicine, [facility] [employer]  Director of Atrial Fibrillation Ablation, [facility]  Director of Ventricular Arrhythmias Research, [facility]  Office Phone Number: [phone]

## 2024-11-11 ENCOUNTER — APPOINTMENT (OUTPATIENT)
Dept: CARDIOLOGY | Facility: HOSPITAL | Age: 74
End: 2024-11-11
Payer: COMMERCIAL

## 2024-11-11 ENCOUNTER — ANESTHESIA EVENT (OUTPATIENT)
Dept: CARDIOLOGY | Facility: HOSPITAL | Age: 74
End: 2024-11-11
Payer: COMMERCIAL

## 2024-11-11 ENCOUNTER — ANESTHESIA (OUTPATIENT)
Dept: CARDIOLOGY | Facility: HOSPITAL | Age: 74
End: 2024-11-11
Payer: COMMERCIAL

## 2024-11-11 ENCOUNTER — HOSPITAL ENCOUNTER (OUTPATIENT)
Facility: HOSPITAL | Age: 74
Setting detail: OUTPATIENT SURGERY
Discharge: HOME | End: 2024-11-11
Attending: INTERNAL MEDICINE | Admitting: INTERNAL MEDICINE
Payer: COMMERCIAL

## 2024-11-11 ENCOUNTER — APPOINTMENT (OUTPATIENT)
Dept: CARDIOLOGY | Facility: CLINIC | Age: 74
End: 2024-11-11
Payer: MEDICARE

## 2024-11-11 VITALS
HEIGHT: 74 IN | OXYGEN SATURATION: 96 % | TEMPERATURE: 97.5 F | SYSTOLIC BLOOD PRESSURE: 136 MMHG | HEART RATE: 62 BPM | RESPIRATION RATE: 17 BRPM | DIASTOLIC BLOOD PRESSURE: 73 MMHG | WEIGHT: 264.11 LBS | BODY MASS INDEX: 33.9 KG/M2

## 2024-11-11 DIAGNOSIS — I48.19 PERSISTENT ATRIAL FIBRILLATION (MULTI): Primary | ICD-10-CM

## 2024-11-11 DIAGNOSIS — I48.19 ATRIAL FIBRILLATION, PERSISTENT (MULTI): ICD-10-CM

## 2024-11-11 LAB
ABO GROUP (TYPE) IN BLOOD: NORMAL
ABO GROUP (TYPE) IN BLOOD: NORMAL
ACT BLD: 308 SEC (ref 83–199)
ACT BLD: 348 SEC (ref 83–199)
ALBUMIN SERPL BCP-MCNC: 4.2 G/DL (ref 3.4–5)
ALP SERPL-CCNC: 53 U/L (ref 33–136)
ALT SERPL W P-5'-P-CCNC: 12 U/L (ref 10–52)
ANION GAP SERPL CALC-SCNC: 10 MMOL/L (ref 10–20)
ANTIBODY SCREEN: NORMAL
APTT PPP: 40 SECONDS (ref 27–38)
AST SERPL W P-5'-P-CCNC: 13 U/L (ref 9–39)
ATRIAL RATE: 59 BPM
ATRIAL RATE: 62 BPM
BILIRUB SERPL-MCNC: 0.6 MG/DL (ref 0–1.2)
BUN SERPL-MCNC: 15 MG/DL (ref 6–23)
CALCIUM SERPL-MCNC: 9.5 MG/DL (ref 8.6–10.3)
CHLORIDE SERPL-SCNC: 102 MMOL/L (ref 98–107)
CO2 SERPL-SCNC: 30 MMOL/L (ref 21–32)
CREAT SERPL-MCNC: 0.93 MG/DL (ref 0.5–1.3)
EGFRCR SERPLBLD CKD-EPI 2021: 86 ML/MIN/1.73M*2
ERYTHROCYTE [DISTWIDTH] IN BLOOD BY AUTOMATED COUNT: 13.1 % (ref 11.5–14.5)
GLUCOSE SERPL-MCNC: 118 MG/DL (ref 74–99)
HCT VFR BLD AUTO: 47.9 % (ref 41–52)
HGB BLD-MCNC: 16.1 G/DL (ref 13.5–17.5)
INR PPP: 1.4 (ref 0.9–1.1)
MCH RBC QN AUTO: 30.4 PG (ref 26–34)
MCHC RBC AUTO-ENTMCNC: 33.6 G/DL (ref 32–36)
MCV RBC AUTO: 90 FL (ref 80–100)
NRBC BLD-RTO: 0 /100 WBCS (ref 0–0)
P AXIS: 59 DEGREES
P AXIS: 7 DEGREES
P OFFSET: 176 MS
P OFFSET: 185 MS
P ONSET: 123 MS
P ONSET: 124 MS
PLATELET # BLD AUTO: 235 X10*3/UL (ref 150–450)
POTASSIUM SERPL-SCNC: 4.1 MMOL/L (ref 3.5–5.3)
PR INTERVAL: 188 MS
PR INTERVAL: 194 MS
PROT SERPL-MCNC: 7.1 G/DL (ref 6.4–8.2)
PROTHROMBIN TIME: 16 SECONDS (ref 9.8–12.8)
Q ONSET: 218 MS
Q ONSET: 220 MS
QRS COUNT: 11 BEATS
QRS COUNT: 9 BEATS
QRS DURATION: 84 MS
QRS DURATION: 88 MS
QT INTERVAL: 408 MS
QT INTERVAL: 422 MS
QTC CALCULATION(BAZETT): 403 MS
QTC CALCULATION(BAZETT): 428 MS
QTC FREDERICIA: 405 MS
QTC FREDERICIA: 426 MS
R AXIS: 10 DEGREES
R AXIS: 5 DEGREES
RBC # BLD AUTO: 5.3 X10*6/UL (ref 4.5–5.9)
RH FACTOR (ANTIGEN D): NORMAL
RH FACTOR (ANTIGEN D): NORMAL
SODIUM SERPL-SCNC: 138 MMOL/L (ref 136–145)
T AXIS: 22 DEGREES
T AXIS: 44 DEGREES
T OFFSET: 424 MS
T OFFSET: 429 MS
VENTRICULAR RATE: 59 BPM
VENTRICULAR RATE: 62 BPM
WBC # BLD AUTO: 7.1 X10*3/UL (ref 4.4–11.3)

## 2024-11-11 PROCEDURE — C1769 GUIDE WIRE: HCPCS | Performed by: INTERNAL MEDICINE

## 2024-11-11 PROCEDURE — 85347 COAGULATION TIME ACTIVATED: CPT

## 2024-11-11 PROCEDURE — C1732 CATH, EP, DIAG/ABL, 3D/VECT: HCPCS | Performed by: INTERNAL MEDICINE

## 2024-11-11 PROCEDURE — 85347 COAGULATION TIME ACTIVATED: CPT | Performed by: INTERNAL MEDICINE

## 2024-11-11 PROCEDURE — 37799 UNLISTED PX VASCULAR SURGERY: CPT | Performed by: INTERNAL MEDICINE

## 2024-11-11 PROCEDURE — C1766 INTRO/SHEATH,STRBLE,NON-PEEL: HCPCS | Performed by: INTERNAL MEDICINE

## 2024-11-11 PROCEDURE — 85610 PROTHROMBIN TIME: CPT | Performed by: NURSE PRACTITIONER

## 2024-11-11 PROCEDURE — 2780000003 HC OR 278 NO HCPCS: Performed by: INTERNAL MEDICINE

## 2024-11-11 PROCEDURE — 93005 ELECTROCARDIOGRAM TRACING: CPT

## 2024-11-11 PROCEDURE — C1730 CATH, EP, 19 OR FEW ELECT: HCPCS | Performed by: INTERNAL MEDICINE

## 2024-11-11 PROCEDURE — 36415 COLL VENOUS BLD VENIPUNCTURE: CPT | Performed by: NURSE PRACTITIONER

## 2024-11-11 PROCEDURE — C1760 CLOSURE DEV, VASC: HCPCS | Performed by: INTERNAL MEDICINE

## 2024-11-11 PROCEDURE — 93656 COMPRE EP EVAL ABLTJ ATR FIB: CPT | Performed by: INTERNAL MEDICINE

## 2024-11-11 PROCEDURE — 99024 POSTOP FOLLOW-UP VISIT: CPT | Performed by: NURSE PRACTITIONER

## 2024-11-11 PROCEDURE — 2500000005 HC RX 250 GENERAL PHARMACY W/O HCPCS: Performed by: NURSE PRACTITIONER

## 2024-11-11 PROCEDURE — 76937 US GUIDE VASCULAR ACCESS: CPT | Performed by: INTERNAL MEDICINE

## 2024-11-11 PROCEDURE — 93005 ELECTROCARDIOGRAM TRACING: CPT | Mod: 59

## 2024-11-11 PROCEDURE — C1759 CATH, INTRA ECHOCARDIOGRAPHY: HCPCS | Performed by: INTERNAL MEDICINE

## 2024-11-11 PROCEDURE — 7100000010 HC PHASE TWO TIME - EACH INCREMENTAL 1 MINUTE: Performed by: INTERNAL MEDICINE

## 2024-11-11 PROCEDURE — 80053 COMPREHEN METABOLIC PANEL: CPT | Performed by: NURSE PRACTITIONER

## 2024-11-11 PROCEDURE — 2500000004 HC RX 250 GENERAL PHARMACY W/ HCPCS (ALT 636 FOR OP/ED): Performed by: INTERNAL MEDICINE

## 2024-11-11 PROCEDURE — 2720000007 HC OR 272 NO HCPCS: Performed by: INTERNAL MEDICINE

## 2024-11-11 PROCEDURE — 86901 BLOOD TYPING SEROLOGIC RH(D): CPT | Performed by: NURSE PRACTITIONER

## 2024-11-11 PROCEDURE — 2500000004 HC RX 250 GENERAL PHARMACY W/ HCPCS (ALT 636 FOR OP/ED)

## 2024-11-11 PROCEDURE — 7100000002 HC RECOVERY ROOM TIME - EACH INCREMENTAL 1 MINUTE: Performed by: INTERNAL MEDICINE

## 2024-11-11 PROCEDURE — 7100000009 HC PHASE TWO TIME - INITIAL BASE CHARGE: Performed by: INTERNAL MEDICINE

## 2024-11-11 PROCEDURE — G0269 OCCLUSIVE DEVICE IN VEIN ART: HCPCS | Performed by: INTERNAL MEDICINE

## 2024-11-11 PROCEDURE — 3700000001 HC GENERAL ANESTHESIA TIME - INITIAL BASE CHARGE: Performed by: INTERNAL MEDICINE

## 2024-11-11 PROCEDURE — 3700000002 HC GENERAL ANESTHESIA TIME - EACH INCREMENTAL 1 MINUTE: Performed by: INTERNAL MEDICINE

## 2024-11-11 PROCEDURE — 86923 COMPATIBILITY TEST ELECTRIC: CPT

## 2024-11-11 PROCEDURE — 99222 1ST HOSP IP/OBS MODERATE 55: CPT | Performed by: NURSE PRACTITIONER

## 2024-11-11 PROCEDURE — 2500000005 HC RX 250 GENERAL PHARMACY W/O HCPCS

## 2024-11-11 PROCEDURE — 7100000001 HC RECOVERY ROOM TIME - INITIAL BASE CHARGE: Performed by: INTERNAL MEDICINE

## 2024-11-11 PROCEDURE — 85027 COMPLETE CBC AUTOMATED: CPT | Performed by: NURSE PRACTITIONER

## 2024-11-11 RX ORDER — LIDOCAINE HYDROCHLORIDE 10 MG/ML
INJECTION, SOLUTION EPIDURAL; INFILTRATION; INTRACAUDAL; PERINEURAL AS NEEDED
Status: DISCONTINUED | OUTPATIENT
Start: 2024-11-11 | End: 2024-11-11 | Stop reason: HOSPADM

## 2024-11-11 RX ORDER — ADENOSINE 3 MG/ML
INJECTION, SOLUTION INTRAVENOUS AS NEEDED
Status: DISCONTINUED | OUTPATIENT
Start: 2024-11-11 | End: 2024-11-11

## 2024-11-11 RX ORDER — ROCURONIUM BROMIDE 10 MG/ML
INJECTION, SOLUTION INTRAVENOUS AS NEEDED
Status: DISCONTINUED | OUTPATIENT
Start: 2024-11-11 | End: 2024-11-11

## 2024-11-11 RX ORDER — PROTAMINE SULFATE 10 MG/ML
INJECTION, SOLUTION INTRAVENOUS AS NEEDED
Status: DISCONTINUED | OUTPATIENT
Start: 2024-11-11 | End: 2024-11-11

## 2024-11-11 RX ORDER — PHENYLEPHRINE 10 MG/250 ML(40 MCG/ML)IN 0.9 % SOD.CHLORIDE INTRAVENOUS
CONTINUOUS PRN
Status: DISCONTINUED | OUTPATIENT
Start: 2024-11-11 | End: 2024-11-11

## 2024-11-11 RX ORDER — FENTANYL CITRATE 50 UG/ML
INJECTION, SOLUTION INTRAMUSCULAR; INTRAVENOUS AS NEEDED
Status: DISCONTINUED | OUTPATIENT
Start: 2024-11-11 | End: 2024-11-11

## 2024-11-11 RX ORDER — PHENYLEPHRINE HCL IN 0.9% NACL 0.4MG/10ML
SYRINGE (ML) INTRAVENOUS AS NEEDED
Status: DISCONTINUED | OUTPATIENT
Start: 2024-11-11 | End: 2024-11-11

## 2024-11-11 RX ORDER — SUCCINYLCHOLINE CHLORIDE 100 MG/5ML
SYRINGE (ML) INTRAVENOUS AS NEEDED
Status: DISCONTINUED | OUTPATIENT
Start: 2024-11-11 | End: 2024-11-11

## 2024-11-11 RX ORDER — PROPOFOL 10 MG/ML
INJECTION, EMULSION INTRAVENOUS AS NEEDED
Status: DISCONTINUED | OUTPATIENT
Start: 2024-11-11 | End: 2024-11-11

## 2024-11-11 RX ORDER — ONDANSETRON HYDROCHLORIDE 2 MG/ML
4 INJECTION, SOLUTION INTRAVENOUS EVERY 8 HOURS PRN
Status: DISCONTINUED | OUTPATIENT
Start: 2024-11-11 | End: 2024-11-11 | Stop reason: HOSPADM

## 2024-11-11 RX ORDER — ONDANSETRON HYDROCHLORIDE 2 MG/ML
INJECTION, SOLUTION INTRAVENOUS AS NEEDED
Status: DISCONTINUED | OUTPATIENT
Start: 2024-11-11 | End: 2024-11-11

## 2024-11-11 RX ORDER — ACETAMINOPHEN 325 MG/1
650 TABLET ORAL EVERY 4 HOURS PRN
Status: DISCONTINUED | OUTPATIENT
Start: 2024-11-11 | End: 2024-11-11 | Stop reason: HOSPADM

## 2024-11-11 RX ORDER — OMEPRAZOLE 40 MG/1
40 CAPSULE, DELAYED RELEASE ORAL
Qty: 84 CAPSULE | Refills: 0 | Status: SHIPPED | OUTPATIENT
Start: 2024-11-11 | End: 2024-12-23

## 2024-11-11 RX ORDER — SOTALOL HYDROCHLORIDE 80 MG/1
80 TABLET ORAL 2 TIMES DAILY
Start: 2024-11-11

## 2024-11-11 RX ORDER — SODIUM CHLORIDE, SODIUM LACTATE, POTASSIUM CHLORIDE, CALCIUM CHLORIDE 600; 310; 30; 20 MG/100ML; MG/100ML; MG/100ML; MG/100ML
INJECTION, SOLUTION INTRAVENOUS CONTINUOUS PRN
Status: DISCONTINUED | OUTPATIENT
Start: 2024-11-11 | End: 2024-11-11

## 2024-11-11 RX ORDER — HEPARIN SODIUM 1000 [USP'U]/ML
INJECTION, SOLUTION INTRAVENOUS; SUBCUTANEOUS AS NEEDED
Status: DISCONTINUED | OUTPATIENT
Start: 2024-11-11 | End: 2024-11-11

## 2024-11-11 RX ORDER — MIDAZOLAM HYDROCHLORIDE 1 MG/ML
INJECTION, SOLUTION INTRAMUSCULAR; INTRAVENOUS AS NEEDED
Status: DISCONTINUED | OUTPATIENT
Start: 2024-11-11 | End: 2024-11-11

## 2024-11-11 RX ORDER — IBUPROFEN 400 MG/1
400 TABLET ORAL EVERY 8 HOURS PRN
Status: DISCONTINUED | OUTPATIENT
Start: 2024-11-11 | End: 2024-11-11 | Stop reason: HOSPADM

## 2024-11-11 RX ORDER — CHLORHEXIDINE GLUCONATE 40 MG/ML
SOLUTION TOPICAL ONCE
Status: COMPLETED | OUTPATIENT
Start: 2024-11-11 | End: 2024-11-11

## 2024-11-11 RX ORDER — LIDOCAINE HYDROCHLORIDE 20 MG/ML
INJECTION, SOLUTION INFILTRATION; PERINEURAL AS NEEDED
Status: DISCONTINUED | OUTPATIENT
Start: 2024-11-11 | End: 2024-11-11

## 2024-11-11 ASSESSMENT — PAIN SCALES - GENERAL
PAINLEVEL_OUTOF10: 0 - NO PAIN
PAIN_LEVEL: 0
PAINLEVEL_OUTOF10: 0 - NO PAIN

## 2024-11-11 ASSESSMENT — PAIN - FUNCTIONAL ASSESSMENT

## 2024-11-11 ASSESSMENT — COLUMBIA-SUICIDE SEVERITY RATING SCALE - C-SSRS
6. HAVE YOU EVER DONE ANYTHING, STARTED TO DO ANYTHING, OR PREPARED TO DO ANYTHING TO END YOUR LIFE?: NO
2. HAVE YOU ACTUALLY HAD ANY THOUGHTS OF KILLING YOURSELF?: NO
1. IN THE PAST MONTH, HAVE YOU WISHED YOU WERE DEAD OR WISHED YOU COULD GO TO SLEEP AND NOT WAKE UP?: NO

## 2024-11-11 NOTE — SIGNIFICANT EVENT
Increased HOB to 15 degrees d/t lower back pain. Right groin site remains soft and stable with no hematoma or oozing, sandbag remains in place.

## 2024-11-11 NOTE — SIGNIFICANT EVENT
"Pt ambulated down brady to RR and voided clear/yellow urine. Right groin site soft and stable with no hematoma or oozing. Per Dr. Riley Pt can discharge after ambulating. Discharge instructions given via \"teach back\" method. Covered: Post Ablation Discharge Instructions/Restrictions, medications/when to resume them, and follow up appointments. Pt states understanding and answers follow up questions correctly. Denies dizziness/lightheadedness/pain. Ready to discharge home via wheelchair.  "

## 2024-11-11 NOTE — PROGRESS NOTES
Patient's status post PVI per Dr. Riley, see full operative report.  Patient will increase Prevacid DR to 40 mg twice daily x 6 weeks then resume daily dosing as before.  Patient will continue sotalol for 3 months then discontinue prior to 7 day Zio patch monitoring.  Outpatient follow-up with Dr. Riley in 4 weeks, 7-day Zio patch monitor in 3 months then 4-month appointment with Dr. Riley.    Patient had episode of bleeding x 2 post PVI.  Pressure held and quick clot applied per [facility] staff protocol.  Patient on bedrest.  Discussed with Dr. Riley.  Continue bedrest for 1 additional hour, if no further bleeding, okay to discharge patient home.

## 2024-11-11 NOTE — DISCHARGE INSTRUCTIONS
INSTRUCTIONS AFTER ATRIAL FIBRILLATION ABLATION PROCEDURE:    After the procedure:  You should return home and rest for the remainder of the day and evening.     DO NOT drive a car, operate machinery or power tools. It is recommended a responsible adult be with you for the first 24 hours after the procedure.    DO NOT make any legal decisions for 24 hours after your procedure.    DO NOT drink any alcoholic drinks or take any non-prescriptive medications that contain alcohol for the first 24 hours.     What to expect:  Some minor bruising is common at each groin access site with minor soreness as if you had banged the area. Bruising may occasionally be seen to extend down the leg. This is normal as is an occasional small quarter sized bump in the area. If larger swelling or more significant pain occurs at the area, please contact the office or go the nearest Emergency Room.     You may have some minor chest pain for the next week or so. The pain will often worsen with a deep breath and be better when leaning forward. This is pericardial chest pain from the ablation and is generally not of concern. It should resolve within a week although it might increase for a day or so after the ablation.    Low grade fevers of around 99 degrees are common in the first day or so post-ablation.  If you develop unexplained fevers exceeding 100 degrees anytime within the first 3 weeks post-ablation, you need to contact the office.     Atrial fibrillation (AFib) can recur in all patients who undergo this ablation for up to 4-8 weeks post-ablation. The ablation itself can cause inflammation (pericarditis) in the atria and this can cause AFib. Some patients will actually experience an increased amount of atrial arrhythmia early after ablation. Approximately 1/3 of patients will have this early recurrence of AFib. Medications should be continued and your heart rate controlled. Nothing else needs be done initially except waiting as in  many cases these episodes of AFib will prove self limited.    Important Medication Instructions:  You will need to continue blood thinner (warfarin, Pradaxa, Xarelto or Eliquis) until instructed otherwise. It is important not to interrupt blood thinner for any reason (other than an emergency) during the first 30 days after ablation.    You will be on Pantoprazole (a heartburn medicine) or related medicine for 6 weeks to protect the esophagus as it can become irritated with ablation. It is very important that you take this medication.    All other medications will generally remain the same unless you are told otherwise.  Resume taking your home medications today (including blood thinner) as listed on the discharge instructions.    Activity and Other Restrictions:  In the first week post-ablation you should take it easy. No heavy lifting or heavy exercise, no treadmill. You can use the stairs if needed but go slowly and minimize the number of times up and down.     Avoid heavy lifting (over 10 pounds), strenuous activity/exercise, squatting or excessive bending for 7 days. This allows for proper healing of the groin.     Avoid sexual activity for 24 hours after you arrive home.    No driving for 48 hours after procedure.      Call 911 if:  Severe dizziness, lightheadedness, or feeling faint    Severe chest pain     Change in mental status or weakness in extremities.    If there is a large amount of bleeding or spurting of blood.  DO NOT drive yourself to the hospital.      Notify Provider if:  Breathing faster than normal.     Fever of 100.4 F (38 C) or higher or chills.    If you develop a large area of bruising or a large lump.  It is normal to notice a small bruise around the puncture site and/or a small lump.     If groin pain is not relieved with acetaminophen (Tylenol).    If you develop tingling, numbness, pain, or coolness in the leg/arm beyond the site where the procedure was performed.    If post procedure  chest discomfort unrelieved with acetaminophen (Tylenol)     Any new concerning symptoms      Wound Care:  The transparent dressing should be removed from the site 24 hours after the procedure.     It is ok to shower after transparent dressing is removed. Gently cleanse the puncture site in your groin with soap and water only.     No tub baths, soaking, hot tubs, or swimming for one week.     If slight bleeding should occur at groin site, lie down and have someone apply firm pressure just above the puncture site for 15 minutes.  If there is a large amount of bleeding or spurting of blood, DO NOT drive yourself to the hospital. Always notify your doctor if bleeding occurs.     Keep site clean and dry. Let air dry or pat dry. You may use a simple bandaid.     You may experience some tenderness, bruising or minimal inflammation. You may take acetaminophen (Tylenol) as directed for discomfort.  If you have any concerns, you may contact Dr. Riley.     Avoid lotions, ointments, or powders until fully healed.    Follow-up Appointments  You will be scheduled for a follow up appointment with your provider in 3-4 months.    A heart monitor may be ordered prior to your provider appointment if indicated.    Any necessary appointments will be scheduled and appear on your After Visit Summary.    Other Instructions:  If you need to speak with Dr. Riley for any reason, please call [phone] during business hours. For urgent questions after normal business hours, please call [phone] and ask for Cardiac Electrophysiology coverage for Dr. Riley to be paged. For medical emergencies, please call [phone].

## 2024-11-11 NOTE — H&P
History Of Present Illness  Venancio Cervantes is a 74 y.o. male presenting with persistent atrial fibrillation for elective PVI.   He was diagnosed approximately 30 years ago with atrial fibrillation. His major complaint was fatigue at that time. He was initially paroxysmal with very low burden of atrial fibrillation, but more recently has become persistent. He underwent a RAKAN guided cardioversion in August 2024 with restoration of sinus rhythm. However, he went back into atrial fibrillation about 24 hours after the procedure. In September, he was admitted to the hospital for a sotalol load, after which we cardioverted him back to normal sinus rhythm. He is not interested in being on antiarrhythmic drugs long-term and is interested in ablation.   Past medical history includes persistent atrial fibrillation currently treated with sotalol, Tenormin, and anticoagulation with Eliquis, hypertension, and hyperlipidemia.  Past Medical History  Past Medical History:   Diagnosis Date    A-fib (Multi)     Arrhythmia     Body mass index (BMI) 37.0-37.9, adult     BMI 37.0-37.9, adult    Cancer (Multi)     Former smoker     Hyperlipidemia     Hypertension     Rash and other nonspecific skin eruption 12/06/2021    Rash       Surgical History  Past Surgical History:   Procedure Laterality Date    OTHER SURGICAL HISTORY  11/11/2021    Cardioversion    OTHER SURGICAL HISTORY  11/11/2021    Ankle surgery    OTHER SURGICAL HISTORY  11/11/2021    Complete colonoscopy    OTHER SURGICAL HISTORY  11/11/2021    Rotator cuff repair    OTHER SURGICAL HISTORY  11/11/2021    Knee replacement    OTHER SURGICAL HISTORY  11/11/2021    Facial surgery        Social History  He reports that he quit smoking about 41 years ago. His smoking use included cigarettes. He started smoking about 56 years ago. He has a 15 pack-year smoking history. He has been exposed to tobacco smoke. He has never used smokeless tobacco. He reports that he does not currently  "use alcohol. He reports that he does not use drugs.    Family History  Family History   Problem Relation Name Age of Onset    Irregular heart beat Mother      Other (arteriosclerotic cardiovascular disease) Sister      Lung cancer Sister      Colon cancer Brother          Allergies  Doxycycline, Metoprolol, and Silver sulfadiazine    Review of Systems   All other systems reviewed and are negative.       Physical Exam  Constitutional:       Appearance: Normal appearance.   HENT:      Head: Normocephalic.   Eyes:      Conjunctiva/sclera: Conjunctivae normal.   Cardiovascular:      Rate and Rhythm: Normal rate and regular rhythm.      Pulses: Normal pulses.      Heart sounds: Normal heart sounds.   Pulmonary:      Effort: Pulmonary effort is normal.   Musculoskeletal:         General: Normal range of motion.   Skin:     General: Skin is warm and dry.   Neurological:      Mental Status: He is alert and oriented to person, place, and time.          Last Recorded Vitals  Blood pressure 148/85, pulse 60, temperature 36.5 °C (97.7 °F), temperature source Temporal, resp. rate 16, height 1.88 m (6' 2\"), weight 120 kg (264 lb 1.8 oz), SpO2 94%.    Relevant Results  Scheduled medications    Continuous medications    PRN medications    Results for orders placed or performed during the hospital encounter of 11/11/24 (from the past 96 hours)   ECG 12 lead STAT   Result Value Ref Range    Ventricular Rate 59 BPM    Atrial Rate 59 BPM    CO Interval 194 ms    QRS Duration 84 ms    QT Interval 408 ms    QTC Calculation(Bazett) 403 ms    P Axis 7 degrees    R Axis 10 degrees    T Axis 44 degrees    QRS Count 9 beats    Q Onset 220 ms    P Onset 123 ms    P Offset 176 ms    T Offset 424 ms    QTC Fredericia 405 ms   Comprehensive Metabolic Panel   Result Value Ref Range    Glucose 118 (H) 74 - 99 mg/dL    Sodium 138 136 - 145 mmol/L    Potassium 4.1 3.5 - 5.3 mmol/L    Chloride 102 98 - 107 mmol/L    Bicarbonate 30 21 - 32 mmol/L    " Anion Gap 10 10 - 20 mmol/L    Urea Nitrogen 15 6 - 23 mg/dL    Creatinine 0.93 0.50 - 1.30 mg/dL    eGFR 86 >60 mL/min/1.73m*2    Calcium 9.5 8.6 - 10.3 mg/dL    Albumin 4.2 3.4 - 5.0 g/dL    Alkaline Phosphatase 53 33 - 136 U/L    Total Protein 7.1 6.4 - 8.2 g/dL    AST 13 9 - 39 U/L    Bilirubin, Total 0.6 0.0 - 1.2 mg/dL    ALT 12 10 - 52 U/L   CBC   Result Value Ref Range    WBC 7.1 4.4 - 11.3 x10*3/uL    nRBC 0.0 0.0 - 0.0 /100 WBCs    RBC 5.30 4.50 - 5.90 x10*6/uL    Hemoglobin 16.1 13.5 - 17.5 g/dL    Hematocrit 47.9 41.0 - 52.0 %    MCV 90 80 - 100 fL    MCH 30.4 26.0 - 34.0 pg    MCHC 33.6 32.0 - 36.0 g/dL    RDW 13.1 11.5 - 14.5 %    Platelets 235 150 - 450 x10*3/uL   Coagulation Screen   Result Value Ref Range    Protime 16.0 (H) 9.8 - 12.8 seconds    INR 1.4 (H) 0.9 - 1.1    aPTT 40 (H) 27 - 38 seconds    ECG 12 lead STAT    Result Date: 11/11/2024  Sinus bradycardia Otherwise normal ECG When compared with ECG of 06-SEP-2024 18:23, T wave inversion no longer evident in Inferior leads QT has shortened       Assessment/Plan   Assessment & Plan  Persistent atrial fibrillation (Multi)      Plan:  PVI  Further clinical recommendations post PVI           Nydia Medellin, RAFY-CNP

## 2024-11-11 NOTE — ANESTHESIA PREPROCEDURE EVALUATION
Venancio Cervantes is a 74 y.o. male here for:      Ablation A-Fib  With Bobby Riley MD  Pre-Op Diagnosis Codes:      * Atrial fibrillation (irregular heartbeat) [I48.19]    Lab Results   Component Value Date    HGB 16.1 11/11/2024    HCT 47.9 11/11/2024    WBC 7.1 11/11/2024     11/11/2024     11/11/2024    K 4.1 11/11/2024     11/11/2024    CREATININE 0.93 11/11/2024    BUN 15 11/11/2024       Social History     Substance and Sexual Activity   Drug Use Never      Tobacco Use: Medium Risk (11/11/2024)    Patient History     Smoking Tobacco Use: Former     Smokeless Tobacco Use: Never     Passive Exposure: Past      Social History     Substance and Sexual Activity   Alcohol Use Not Currently    Comment: 1x weekly        Allergies   Allergen Reactions    Doxycycline Other    Metoprolol Unknown    Silver Sulfadiazine Rash       Current Outpatient Medications   Medication Instructions    apixaban (ELIQUIS) 5 mg, 2 times daily    atenolol (TENORMIN) 25 mg, oral, Daily    clotrimazole-betamethasone (Lotrisone) cream 1 Application, See admin instructions    coenzyme Q-10 100 mg, Daily    latanoprost (Xalatan) 0.005 % ophthalmic solution 1 drop, Daily RT    losartan (COZAAR) 100 mg, oral, Daily    magnesium oxide (MAG-OX) 400 mg, oral, Daily    omeprazole (PRILOSEC) 40 mg, Daily before breakfast    sildenafil (Viagra) 100 mg tablet take 1 tablet by mouth once daily 1 hour before needed    sotalol (BETAPACE) 80 mg, oral, 2 times daily       Past Medical History:   Diagnosis Date    A-fib (Multi)     Arrhythmia     Body mass index (BMI) 37.0-37.9, adult     BMI 37.0-37.9, adult    Cancer (Multi)     Former smoker     Hyperlipidemia     Hypertension     Rash and other nonspecific skin eruption 12/06/2021    Rash       Past Surgical History:   Procedure Laterality Date    OTHER SURGICAL HISTORY  11/11/2021    Cardioversion    OTHER SURGICAL HISTORY  11/11/2021    Ankle surgery    OTHER SURGICAL HISTORY   "11/11/2021    Complete colonoscopy    OTHER SURGICAL HISTORY  11/11/2021    Rotator cuff repair    OTHER SURGICAL HISTORY  11/11/2021    Knee replacement    OTHER SURGICAL HISTORY  11/11/2021    Facial surgery       Family History   Problem Relation Name Age of Onset    Irregular heart beat Mother      Other (arteriosclerotic cardiovascular disease) Sister      Lung cancer Sister      Colon cancer Brother         Relevant Problems   Cardiac   (+) Atrial fibrillation (Multi)   (+) Atrial fibrillation, persistent (Multi)   (+) Atypical chest pain   (+) Essential hypertension   (+) Mitral valve regurgitation   (+) Mixed hyperlipidemia   (+) Nonrheumatic tricuspid valve regurgitation   (+) PVC (premature ventricular contraction)   (+) Persistent atrial fibrillation (Multi)      Neuro   (+) Anxiety   (+) Carotid artery stenosis      GI   (+) Irritable bowel syndrome      Musculoskeletal   (+) Knee osteoarthritis   (+) Osteoarthritis       Visit Vitals  /81   Pulse 63   Temp 36.5 °C (97.7 °F) (Temporal)   Resp 19   Ht 1.88 m (6' 2\")   Wt 120 kg (264 lb 1.8 oz)   SpO2 96%   BMI 33.91 kg/m²   Smoking Status Former   BSA 2.5 m²       NPO Details:  NPO/Void Status  Date of Last Liquid: 11/11/24  Time of Last Liquid: 0700  Date of Last Solid: 11/10/24  Time of Last Solid: 2100  Last Intake Type: Clear fluids  Time of Last Void: 0901        Physical Exam    Airway  Mallampati: II     Cardiovascular - normal exam     Dental - normal exam  (+) upper dentures, lower dentures     Pulmonary - normal exam     Abdominal   (+) obese  Abdomen: soft             Anesthesia Plan    History of general anesthesia?: yes  History of complications of general anesthesia?: no    ASA 2     general     intravenous induction   Anesthetic plan and risks discussed with patient.    Plan discussed with CRNA.      "

## 2024-11-11 NOTE — ANESTHESIA PROCEDURE NOTES
Airway  Date/Time: 11/11/2024 11:01 AM  Urgency: elective    Airway not difficult    Staffing  Performed: CRNA and SRNA   Authorized by: Primo Chaudhry MD    Performed by: RAFY Messer-CRNA, DNAMICHEAL  Patient location during procedure: OR ([facility] Lab)    Indications and Patient Condition  Indications for airway management: anesthesia  Spontaneous ventilation: present  Sedation level: deep  Preoxygenated: yes  Patient position: sniffing  Mask difficulty assessment: 1 - vent by mask    Final Airway Details  Final airway type: endotracheal airway      Successful airway: ETT  Cuffed: yes   Successful intubation technique: direct laryngoscopy  Facilitating devices/methods: intubating stylet  Endotracheal tube insertion site: oral  Blade: Andres  Blade size: #4  ETT size (mm): 7.5  Cormack-Lehane Classification: grade I - full view of glottis  Placement verified by: capnometry   Cuff volume (mL): 10  Measured from: teeth  ETT to teeth (cm): 23  Number of attempts at approach: 1  Ventilation between attempts: none  Number of other approaches attempted: 0

## 2024-11-11 NOTE — SIGNIFICANT EVENT
Spoke with Nydia Medellin CNP. Plan is to ambulate with Pt at 1730, assess right groin, and if stable/they remain at baseline discharge Pt home via wheelchair. Updated Pt. They deny further needs at this time. Right groin remains soft and stable with no hematoma or oozing.

## 2024-11-11 NOTE — Clinical Note
[facility]NCOUNTER    Basic Information  Patient: Desiree Mooney Age: 22 month old Sex: female  MRN: [id number] Encounter Date: 9/14/2020, 3:33 PM  PCP: Yeyo Biggs MD        Chief Complaint  Chief Complaint   Patient presents with   • Cough       History of Present Illness    Patient presents for URI symptoms      I have reviewed the non physician practitioners documentation, personally taken the patient's history, performed an exam and agree with the physical findings, diagnosis and management plan.      Orders  Medications - No data to display      Laboratory and Radiology Results    No results found for this visit on 09/14/20.    No orders to display         Physical Exam  Child looks great      ED Course  I participated in the following activities of this patients care: the medical history, the physical exam, medical decision making.   I personally performed: supervision of the patient's care.   The case was discussed with: the non physician practitioners, Midlevel Provider.   Evaluation and management service: I agree with the evaluation and management decisions made in this patient's care.   Results interpretation: I agree with the study interpretation in this patient's care, History, physical, EMR documentation completed by Midlevel Provider has been reviewed and agree.           Impression and Plan    Diagnosis:  Upper respiratory infection      Disposition:  DC home                  Anton Maravilla MD, 9/14/2020 3:33 PM              Anton Maravilla MD  09/14/20 1533     Inserted

## 2024-11-11 NOTE — Clinical Note
Sheath was exchanged in the right femoral vein with SHEATH, V.i. LaboratoriesROBLES, W/.038 GUIDEWIRE, 10 CM,  8FR INTRODUCER, 8FR ANSELMO, 2.5 CM DIALATOR.

## 2024-11-11 NOTE — ANESTHESIA POSTPROCEDURE EVALUATION
Patient: Venancio Cervantes    Procedure Summary       Date: 11/11/24 Room / Location: [facility] / [facility]    Anesthesia Start: 1044 Anesthesia Stop:     Procedure: Ablation A-Fib (Bilateral) Diagnosis:       Persistent atrial fibrillation (Multi)      (Same)    Providers: Bobby Riley MD Responsible Provider: Primo Chaudhry MD    Anesthesia Type: general ASA Status: 2            Anesthesia Type: general    Vitals Value Taken Time   /71 11/11/24 1315   Temp 36.4 11/11/24 1315   Pulse 66 11/11/24 1313   Resp 18 11/11/24 1313   SpO2 95 11/11/24 1315   Vitals shown include unfiled device data.    Anesthesia Post Evaluation    Patient location during evaluation: bedside  Patient participation: complete - patient participated  Level of consciousness: awake and alert  Pain score: 0  Pain management: adequate  Airway patency: patent  Cardiovascular status: acceptable and hemodynamically stable  Respiratory status: acceptable and face mask  Hydration status: acceptable  Postoperative Nausea and Vomiting: none        No notable events documented.

## 2024-11-11 NOTE — SIGNIFICANT EVENT
Pt coughed and began to bleed from right femoral puncture; manual pressure held x5 minutes and new dressing/quick clot applied. Sandbag also applied to right groin site. Nydia Medellin CNP updated with current status of Pt.

## 2024-11-11 NOTE — SIGNIFICANT EVENT
Upon arrival to room focused assessment performed and WDL. Right femoral site soft and stable with no hematoma or oozing. Educated Pt on current restrictions regarding right femoral venous puncture, he states understanding and denies needs at this time. Pt maintained on 5L O2 via NC. Denies dizziness/lightheadedness/pain.

## 2024-11-11 NOTE — SIGNIFICANT EVENT
Pt ambulated down brady to  and voided clear/yellow urine. While standing in restroom Pt hit call button and RN went in, Pt was bleeding down leg onto the floor. Assisted Pt back to his room and had him lie flat. Manual pressure placed onto right femoral vein. Manual pressure held x10 minutes. Quick clot/tegaderm applied once hemostasis was achieved. Pt cleaned with wipes and gown changed. Spoke with Nydia Medellin CNP and she would like Pt flat for another 2 hours and then attempt ambulation again. Pt and wife notified. Explained current restrictions to both Pt and wife, they state understanding at this time.

## 2024-11-11 NOTE — SIGNIFICANT EVENT
HOB increased to 30 degrees, right groin site soft and stable with no hematoma or oozing. Pt given ice water but declines further food or drink at this time. Wife sitting at bedside. Right groin site remains soft and stable with no hematoma or oozing.

## 2024-11-12 LAB
BLOOD EXPIRATION DATE: NORMAL
BLOOD EXPIRATION DATE: NORMAL
DISPENSE STATUS: NORMAL
DISPENSE STATUS: NORMAL
PRODUCT BLOOD TYPE: 5100
PRODUCT BLOOD TYPE: 5100
PRODUCT CODE: NORMAL
PRODUCT CODE: NORMAL
UNIT ABO: NORMAL
UNIT ABO: NORMAL
UNIT NUMBER: NORMAL
UNIT NUMBER: NORMAL
UNIT RH: NORMAL
UNIT RH: NORMAL
UNIT VOLUME: 281
UNIT VOLUME: 350
XM INTEP: NORMAL
XM INTEP: NORMAL

## 2024-11-27 NOTE — Clinical Note
Sheath was exchanged in the right femoral vein with DEVICE, CLOSURE, PERCLOSE, PROSTYLE. Price (Do Not Change): 0.00 Instructions: This plan will send the code FBSD to the PM system.  DO NOT or CHANGE the price. Detail Level: Simple

## 2024-12-06 ENCOUNTER — APPOINTMENT (OUTPATIENT)
Dept: CARDIOLOGY | Facility: CLINIC | Age: 74
End: 2024-12-06
Payer: COMMERCIAL

## 2024-12-06 VITALS
SYSTOLIC BLOOD PRESSURE: 110 MMHG | WEIGHT: 264.5 LBS | BODY MASS INDEX: 35.06 KG/M2 | HEART RATE: 64 BPM | HEIGHT: 73 IN | DIASTOLIC BLOOD PRESSURE: 70 MMHG

## 2024-12-06 DIAGNOSIS — I10 ESSENTIAL HYPERTENSION: ICD-10-CM

## 2024-12-06 DIAGNOSIS — I65.23 BILATERAL CAROTID ARTERY STENOSIS: ICD-10-CM

## 2024-12-06 DIAGNOSIS — Z87.891 FORMER SMOKER: ICD-10-CM

## 2024-12-06 DIAGNOSIS — E78.2 MIXED HYPERLIPIDEMIA: ICD-10-CM

## 2024-12-06 DIAGNOSIS — I36.1 NONRHEUMATIC TRICUSPID VALVE REGURGITATION: ICD-10-CM

## 2024-12-06 DIAGNOSIS — I34.0 NONRHEUMATIC MITRAL VALVE REGURGITATION: ICD-10-CM

## 2024-12-06 DIAGNOSIS — I48.19 ATRIAL FIBRILLATION, PERSISTENT (MULTI): ICD-10-CM

## 2024-12-06 PROCEDURE — 3078F DIAST BP <80 MM HG: CPT | Performed by: INTERNAL MEDICINE

## 2024-12-06 PROCEDURE — 3074F SYST BP LT 130 MM HG: CPT | Performed by: INTERNAL MEDICINE

## 2024-12-06 PROCEDURE — 99214 OFFICE O/P EST MOD 30 MIN: CPT | Performed by: INTERNAL MEDICINE

## 2024-12-06 PROCEDURE — 1159F MED LIST DOCD IN RCRD: CPT | Performed by: INTERNAL MEDICINE

## 2024-12-06 ASSESSMENT — ENCOUNTER SYMPTOMS
CONSTITUTIONAL NEGATIVE: 1
CARDIOVASCULAR NEGATIVE: 1
NEUROLOGICAL NEGATIVE: 1
RESPIRATORY NEGATIVE: 1

## 2024-12-06 NOTE — PROGRESS NOTES
CARDIOLOGY OFFICE VISIT      CHIEF COMPLAINT  Chief Complaint   Patient presents with    Follow-up     1 year        HISTORY OF PRESENT ILLNESS  Venancio Cervantes is a 74 y.o. year old male patient with a history of hypertension, paroxysmal A-fib s/p cardioversion in 2021 with a recurrent episode for which she had repeat cardioversion in August 2024 with restoration of sinus rhythm.  Unfortunately went back into A-fib within about 24 hours after the procedure.  He was subsequently admitted for sotalol loading and repeat cardioversion and referred for elective PVI which was successfully done in November 2024.  He had a recent stress test from September 2024 that was normal with no ischemia or myocardial infarction.  The EF is 54%.  He is precardioversion RAKAN also showed normal LV function with 1-2+ mitral regurgitation.    ASSESSMENT AND PLAN  1.  Paroxysmal A-fib s/p PVI: He continues to maintain sinus rhythm on his current medications including sotalol for which she has a follow-up appointment pending with EP., will continue Eliquis for anticoagulation.  2.  Hypertension: Blood pressures well controlled on current medications which we will continue.  3.  Overweight: Is encouraged to lose weight at least 10 pounds prior to his next follow-up.    Problem List Items Addressed This Visit          Cardiac and Vasculature    Essential hypertension    Carotid artery stenosis    Nonrheumatic tricuspid valve regurgitation    Mitral valve regurgitation    Mixed hyperlipidemia    Atrial fibrillation, persistent (Multi)       Endocrine/Metabolic    BMI 35.0-35.9,adult       Tobacco    Former smoker       Recent Cardiovascular Testing:    Echo-  Stress-  Cath-  Carotid Ultrasound-    Past Medical History  Past Medical History:   Diagnosis Date    A-fib (Multi)     Arrhythmia     Body mass index (BMI) 37.0-37.9, adult     BMI 37.0-37.9, adult    Cancer (Multi)     Former smoker     Hyperlipidemia     Hypertension     Rash and  other nonspecific skin eruption 2021    Rash       Social History  Social History     Tobacco Use    Smoking status: Former     Current packs/day: 0.00     Average packs/day: 1 pack/day for 15.0 years (15.0 ttl pk-yrs)     Types: Cigarettes     Start date:      Quit date:      Years since quittin.9     Passive exposure: Past    Smokeless tobacco: Never   Substance Use Topics    Alcohol use: Not Currently     Comment: 1x weekly    Drug use: Never       Family History     Family History   Problem Relation Name Age of Onset    Irregular heart beat Mother      Other (arteriosclerotic cardiovascular disease) Sister      Lung cancer Sister      Colon cancer Brother          Allergies:  Allergies   Allergen Reactions    Doxycycline Other    Metoprolol Unknown    Silver Sulfadiazine Rash        Outpatient Medications:  Current Outpatient Medications   Medication Instructions    apixaban (ELIQUIS) 5 mg, 2 times daily    atenolol (TENORMIN) 25 mg, oral, Daily    clotrimazole-betamethasone (Lotrisone) cream 1 Application, See admin instructions    coenzyme Q-10 100 mg, Daily    latanoprost (Xalatan) 0.005 % ophthalmic solution 1 drop, Daily RT    losartan (COZAAR) 100 mg, oral, Daily    magnesium oxide (MAG-OX) 400 mg, oral, Daily    omeprazole (PRILOSEC) 40 mg, oral, 2 times daily before meals, For 6 weeks then resume daily dosing    sildenafil (Viagra) 100 mg tablet take 1 tablet by mouth once daily 1 hour before needed    sotalol (BETAPACE) 80 mg, oral, 2 times daily, For 3 months then discontinue prior to 7 day zio patch monitoring        Recent Lab Results:    CBC:   Lab Results   Component Value Date    WBC 7.1 2024    RBC 5.30 2024    HGB 16.1 2024    HCT 47.9 2024     2024        CMP:    Lab Results   Component Value Date     2024    K 4.1 2024     2024    CO2 30 2024    BUN 15 2024    CREATININE 0.93 2024    GLUCOSE  "118 (H) 11/11/2024    CALCIUM 9.5 11/11/2024       Magnesium:    Lab Results   Component Value Date    MG 2.03 09/04/2024       Lipid Profile:    Lab Results   Component Value Date    TRIG 98 04/12/2023    HDL 43.0 04/12/2023       TSH:    Lab Results   Component Value Date    TSH 1.83 07/03/2019       BNP:   No results found for: \"BNP\"     PT/INR:    Lab Results   Component Value Date    PROTIME 16.0 (H) 11/11/2024    INR 1.4 (H) 11/11/2024       HgBA1c:    Lab Results   Component Value Date    HGBA1C 5.5 07/03/2019       BMP:  Lab Results   Component Value Date     11/11/2024     09/04/2024     08/15/2024    K 4.1 11/11/2024    K 4.5 09/04/2024    K 4.3 08/15/2024     11/11/2024     09/04/2024     08/15/2024    CO2 30 11/11/2024    CO2 29 09/04/2024    CO2 31 08/15/2024    BUN 15 11/11/2024    BUN 16 09/04/2024    BUN 18 08/15/2024    CREATININE 0.93 11/11/2024    CREATININE 0.99 09/04/2024    CREATININE 1.09 08/15/2024       CBC:  Lab Results   Component Value Date    WBC 7.1 11/11/2024    WBC 8.5 09/04/2024    WBC 8.0 08/22/2024    RBC 5.30 11/11/2024    RBC 5.48 09/04/2024    RBC 5.50 08/22/2024    HGB 16.1 11/11/2024    HGB 16.7 09/04/2024    HGB 16.9 08/22/2024    HCT 47.9 11/11/2024    HCT 50.0 09/04/2024    HCT 49.7 08/22/2024    MCV 90 11/11/2024    MCV 91 09/04/2024    MCV 90 08/22/2024    MCH 30.4 11/11/2024    MCH 30.5 09/04/2024    MCH 30.7 08/22/2024    MCHC 33.6 11/11/2024    MCHC 33.4 09/04/2024    MCHC 34.0 08/22/2024    RDW 13.1 11/11/2024    RDW 13.2 09/04/2024    RDW 13.2 08/22/2024     11/11/2024     09/04/2024     08/22/2024       Cardiac Enzymes:    No results found for: \"TROPHS\"    Hepatic Function Panel:    Lab Results   Component Value Date    ALKPHOS 53 11/11/2024    ALT 12 11/11/2024    AST 13 11/11/2024    PROT 7.1 11/11/2024    BILITOT 0.6 11/11/2024         REVIEW OF SYSTEMS  Review of Systems   Constitutional: Negative. "   Cardiovascular: Negative.    Respiratory: Negative.     Neurological: Negative.    All other systems reviewed and are negative.      VITALS  Vitals:    12/06/24 0959   BP: 110/70   Pulse: 64     Wt Readings from Last 4 Encounters:   12/06/24 120 kg (264 lb 8 oz)   11/11/24 120 kg (264 lb 1.8 oz)   10/08/24 120 kg (265 lb)   09/04/24 119 kg (263 lb 3.7 oz)       PHYSICAL EXAM  Constitutional:       Appearance: Healthy appearance.   Eyes:      Pupils: Pupils are equal, round, and reactive to light.   Pulmonary:      Effort: Pulmonary effort is normal.      Breath sounds: Normal breath sounds.   Cardiovascular:      PMI at left midclavicular line. Normal rate. Regular rhythm.      Murmurs: There is no murmur.      No gallop.  No click. No rub.   Pulses:     Intact distal pulses.   Edema:     Comments: Right ankle edema 1-2+  Musculoskeletal: Normal range of motion.      Cervical back: Normal range of motion. Skin:     General: Skin is warm and dry.   Neurological:      General: No focal deficit present.      Mental Status: Alert and oriented to person, place and time.

## 2024-12-06 NOTE — PATIENT INSTRUCTIONS
Continue same medications and treatments.   Patient educated on proper medication use.   Patient educated on risk factor modification.   Please bring any lab results from other providers / physicians to your next appointment.     Please bring all medicines, vitamins, and herbal supplements with you when you come to the office.     Prescriptions will not be filled unless you are compliant with your follow up appointments or have a follow up appointment scheduled as per instruction of your physician. Refills should be requested at the time of your visit.  6 month visit

## 2024-12-10 ENCOUNTER — OFFICE VISIT (OUTPATIENT)
Dept: CARDIOLOGY | Facility: CLINIC | Age: 74
End: 2024-12-10
Payer: COMMERCIAL

## 2024-12-10 VITALS
TEMPERATURE: 97.3 F | SYSTOLIC BLOOD PRESSURE: 121 MMHG | WEIGHT: 264 LBS | HEIGHT: 74 IN | DIASTOLIC BLOOD PRESSURE: 72 MMHG | HEART RATE: 64 BPM | BODY MASS INDEX: 33.88 KG/M2 | OXYGEN SATURATION: 95 %

## 2024-12-10 DIAGNOSIS — I48.19 ATRIAL FIBRILLATION, PERSISTENT (MULTI): Primary | ICD-10-CM

## 2024-12-10 PROCEDURE — 1036F TOBACCO NON-USER: CPT | Performed by: INTERNAL MEDICINE

## 2024-12-10 PROCEDURE — 93010 ELECTROCARDIOGRAM REPORT: CPT | Performed by: INTERNAL MEDICINE

## 2024-12-10 PROCEDURE — 3074F SYST BP LT 130 MM HG: CPT | Performed by: INTERNAL MEDICINE

## 2024-12-10 PROCEDURE — 99213 OFFICE O/P EST LOW 20 MIN: CPT | Performed by: INTERNAL MEDICINE

## 2024-12-10 PROCEDURE — 3078F DIAST BP <80 MM HG: CPT | Performed by: INTERNAL MEDICINE

## 2024-12-10 PROCEDURE — G2211 COMPLEX E/M VISIT ADD ON: HCPCS | Performed by: INTERNAL MEDICINE

## 2024-12-10 PROCEDURE — 93005 ELECTROCARDIOGRAM TRACING: CPT | Performed by: INTERNAL MEDICINE

## 2024-12-10 PROCEDURE — 1159F MED LIST DOCD IN RCRD: CPT | Performed by: INTERNAL MEDICINE

## 2024-12-10 ASSESSMENT — PATIENT HEALTH QUESTIONNAIRE - PHQ9
1. LITTLE INTEREST OR PLEASURE IN DOING THINGS: NOT AT ALL
2. FEELING DOWN, DEPRESSED OR HOPELESS: NOT AT ALL
SUM OF ALL RESPONSES TO PHQ9 QUESTIONS 1 AND 2: 0

## 2024-12-10 NOTE — PROGRESS NOTES
Referring Provider: No ref. provider found  Reason for Consult: Persistent atrial fibrillation    History of Present Illness:      Venancio Cervantes is a 74 y.o. year old male patient with a history significant for persistent atrial fibrillation, hypertension, hyperlipidemia who is referred by Nica Aguilar for management of atrial fibrillation.    He was diagnosed approximately 30 years ago with atrial fibrillation.  His major complaint was fatigue at that time.  He was initially paroxysmal with very low burden of atrial fibrillation, but more recently has become persistent.  He underwent a RAKAN guided cardioversion in August 2024 with restoration of sinus rhythm.  However, he went back into atrial fibrillation about 24 hours after the procedure.  In September, he was admitted to the hospital for a sotalol load, after which we cardioverted him back to normal sinus rhythm.  He is not interested in being on antiarrhythmic drugs long-term and is interested in ablation.    After his last visit, we performed atrial fibrillation ablation.  He had a structurally normal heart with normal electrical parameters.  He had a pulmonary vein isolation only.  He is doing well after the ablation without any complaints or without any recurrent arrhythmia.    Focused Cardiovascular Problem List:  Persistent atrial fibrillation: [id number] equals 3, on Eliquis and sotalol.  Symptomatic.  Status post pulmonary vein isolation on 11/11/2024.  Hypertension  Hyperlipidemia      Past Medical and Surgical History:  Mr. Cervantes  has a past medical history of A-fib (Multi), Arrhythmia, Body mass index (BMI) 37.0-37.9, adult, Cancer (Multi), Former smoker, Hyperlipidemia, Hypertension, and Rash and other nonspecific skin eruption (12/06/2021).    has a past surgical history that includes Other surgical history (11/11/2021); Other surgical history (11/11/2021); Other surgical history (11/11/2021); Other surgical history  (2021); Other surgical history (2021); Other surgical history (2021); and Cardiac electrophysiology procedure (Bilateral, 2024).    Social History:  Social History     Tobacco Use    Smoking status: Former     Current packs/day: 0.00     Average packs/day: 1 pack/day for 15.0 years (15.0 ttl pk-yrs)     Types: Cigarettes     Start date:      Quit date:      Years since quittin.9     Passive exposure: Past    Smokeless tobacco: Never   Substance Use Topics    Alcohol use: Not Currently     Comment: 1x weekly        Drug use:  Denies      Relevant Family History:   Family History   Problem Relation Name Age of Onset    Irregular heart beat Mother      Other (arteriosclerotic cardiovascular disease) Sister      Lung cancer Sister      Colon cancer Brother          Allergies:  Allergies   Allergen Reactions    Doxycycline Other    Metoprolol Unknown    Silver Sulfadiazine Rash        Medications:  Current Outpatient Medications   Medication Instructions    apixaban (ELIQUIS) 5 mg, 2 times daily    atenolol (TENORMIN) 25 mg, oral, Daily    clotrimazole-betamethasone (Lotrisone) cream 1 Application, See admin instructions    coenzyme Q-10 100 mg, Daily    latanoprost (Xalatan) 0.005 % ophthalmic solution 1 drop, Daily RT    losartan (COZAAR) 100 mg, oral, Daily    magnesium oxide (MAG-OX) 400 mg, oral, Daily    omeprazole (PRILOSEC) 40 mg, oral, 2 times daily before meals, For 6 weeks then resume daily dosing    sildenafil (Viagra) 100 mg tablet take 1 tablet by mouth once daily 1 hour before needed    sotalol (BETAPACE) 80 mg, oral, 2 times daily, For 3 months then discontinue prior to 7 day zio patch monitoring         Objective   Physical Exam:  Last Recorded Vitals:      2024     1:36 PM 2024     1:41 PM 2024     1:46 PM 2024     1:51 PM 2024     1:53 PM 2024     9:59 AM 12/10/2024    11:27 AM   Vitals   Systolic 128  136   110 121   Diastolic 64   "73   70 72   BP Location      Left arm    Heart Rate 62 64 64 62 62 64 64   Temp       36.3 °C (97.3 °F)   Resp 22 21 19 20 17     Height      1.848 m (6' 0.75\") 1.88 m (6' 2\")   Weight (lb)      264.5 264   BMI      35.14 kg/m2 33.9 kg/m2   BSA (m2)      2.48 m2 2.5 m2   Visit Report      Report Report    Visit Vitals  /72   Pulse 64   Temp 36.3 °C (97.3 °F)   Ht 1.88 m (6' 2\")   Wt 120 kg (264 lb)   SpO2 95%   BMI 33.90 kg/m²   Smoking Status Former   BSA 2.5 m²      Gen: NAD, sitting comfortably  HEENT: NC/AT  Card: RRR, no m/r/g  Pulm: Clear to auscultation bilaterally  Ext: No LE edema  Neuro: No focal deficits    Diagnostic Results      My Interpretation of Reviewed Study(s):  Prior ECGs (reviewed and my interpretation):  12/10/2024: Normal sinus rhythm, normal ECG  10/8/2024: Sinus bradycardia, otherwise normal ECG    Echocardiography:  8/22/2024: No left atrial appendage thrombus, normal LV ejection fraction, no PFO, mild mitral valve regurgitation    Stress Test:   9/2024: Normal stress test without any perfusion defects        Relevant Labs:  Lab Results   Component Value Date    CREATININE 0.93 11/11/2024    CREATININE 0.99 09/04/2024    CREATININE 1.09 08/15/2024    K 4.1 11/11/2024    K 4.5 09/04/2024    K 4.3 08/15/2024    HGBA1C 5.5 07/03/2019    HGB 16.1 11/11/2024    HGB 16.7 09/04/2024    HGB 16.9 08/22/2024    INR 1.4 (H) 11/11/2024    INR 1.4 (H) 09/04/2024    INR 1.5 (H) 08/22/2024    AST 13 11/11/2024    AST 14 09/04/2024    AST 13 04/12/2023    ALT 12 11/11/2024    ALT 13 09/04/2024    ALT 12 04/12/2023       Assessment/Plan   Assessment and Plan:  In summary, patient is a pleasant 74 y.o.male with a history of recurrent symptomatic drug-refractory persistent atrial fibrillation diagnosed in the 1990s, has been maintained on beta-blockers for a long time but now on sotalol, who presents today for follow-up after recent pulmonary vein isolation last month.  He is doing very well with no " complaints and no recurrent arrhythmias.    Recommendations:  - Continue PPI for total of 6 weeks after ablation  - Continue sotalol for a total of 3 months after ablation then stop  - 3 months after the ablation, he will have a 7-day patch monitor off all antiarrhythmic drug therapy  - Follow-up 4 months after ablation      Return to Clinic:  After ablation    Thank you very much for allowing me to participate in the care of this patient. Please do not hesitate to contact me with any further questions or concerns.    Bobby Riley MD  Clinical Cardiac Electrophysiologist, [facility] [place]    of Medicine, [employer]  Director of Atrial Fibrillation Ablation, [facility]  Director of Ventricular Arrhythmias Research, [facility]  Office Phone Number: [phone]

## 2025-01-21 ENCOUNTER — TELEPHONE (OUTPATIENT)
Dept: CARDIOLOGY | Facility: CLINIC | Age: 75
End: 2025-01-21
Payer: COMMERCIAL

## 2025-01-21 NOTE — TELEPHONE ENCOUNTER
Yoanna from Dr. Granado's office called and LM stating they faxed paperwork for holding Eliquis prior to surgery. Call back number: [phone]. Called Yoanna to confirm if faxed to correct number as patient also sees Dr. Riley. No answer. LM to call our office back. Routed to Sreekanth SANDHU LPN

## 2025-01-21 NOTE — TELEPHONE ENCOUNTER
Per Dr Ivory patient is an acceptable risk from a cardiac standpoint for upcoming procedure with Dr Granado. Hold Eliquis 2-3 days. I called Yoanna and left message that I was faxing this note to [phone].

## 2025-01-21 NOTE — TELEPHONE ENCOUNTER
Yoanna called and stated they just need a note faxed to them. Fax to [phone], ATTN: Dr. Baig. Routed to Sreekanth SANDHU LPN

## 2025-01-21 NOTE — TELEPHONE ENCOUNTER
I called back and left message for Yoanna that we got paperwork but there is no line for Dr Ivory signature. If they just want a note then call back and let us know.

## 2025-02-11 ENCOUNTER — TELEPHONE (OUTPATIENT)
Dept: PRIMARY CARE | Facility: CLINIC | Age: 75
End: 2025-02-11

## 2025-02-11 ENCOUNTER — APPOINTMENT (OUTPATIENT)
Dept: CARDIOLOGY | Facility: CLINIC | Age: 75
End: 2025-02-11
Payer: COMMERCIAL

## 2025-02-11 DIAGNOSIS — I48.19 PERSISTENT ATRIAL FIBRILLATION (MULTI): ICD-10-CM

## 2025-02-26 PROCEDURE — 93248 EXT ECG>7D<15D REV&INTERPJ: CPT | Performed by: INTERNAL MEDICINE

## 2025-02-27 DIAGNOSIS — I48.0 PAROXYSMAL ATRIAL FIBRILLATION (MULTI): ICD-10-CM

## 2025-02-27 DIAGNOSIS — I10 ESSENTIAL HYPERTENSION: ICD-10-CM

## 2025-02-27 RX ORDER — LOSARTAN POTASSIUM 100 MG/1
100 TABLET ORAL DAILY
Qty: 30 TABLET | Refills: 0 | Status: SHIPPED | OUTPATIENT
Start: 2025-02-27 | End: 2025-03-29

## 2025-02-27 RX ORDER — ATENOLOL 25 MG/1
25 TABLET ORAL DAILY
Qty: 30 TABLET | Refills: 0 | Status: SHIPPED | OUTPATIENT
Start: 2025-02-27 | End: 2025-03-29

## 2025-02-27 RX ORDER — OMEPRAZOLE 20 MG/1
20 CAPSULE, DELAYED RELEASE ORAL
Qty: 30 CAPSULE | Refills: 0 | Status: SHIPPED | OUTPATIENT
Start: 2025-02-27 | End: 2025-03-29

## 2025-02-27 RX ORDER — OMEPRAZOLE 20 MG/1
20 CAPSULE, DELAYED RELEASE ORAL
COMMUNITY
End: 2025-02-27 | Stop reason: SDUPTHER

## 2025-03-10 ENCOUNTER — APPOINTMENT (OUTPATIENT)
Dept: PRIMARY CARE | Facility: CLINIC | Age: 75
End: 2025-03-10
Payer: COMMERCIAL

## 2025-03-10 VITALS
SYSTOLIC BLOOD PRESSURE: 103 MMHG | BODY MASS INDEX: 36.9 KG/M2 | DIASTOLIC BLOOD PRESSURE: 61 MMHG | HEART RATE: 54 BPM | TEMPERATURE: 97.3 F | WEIGHT: 263.6 LBS | HEIGHT: 71 IN

## 2025-03-10 DIAGNOSIS — N52.9 ERECTILE DYSFUNCTION, UNSPECIFIED ERECTILE DYSFUNCTION TYPE: ICD-10-CM

## 2025-03-10 DIAGNOSIS — I10 ESSENTIAL HYPERTENSION: ICD-10-CM

## 2025-03-10 DIAGNOSIS — Z87.891 FORMER SMOKER: ICD-10-CM

## 2025-03-10 DIAGNOSIS — Z00.00 HEALTH MAINTENANCE EXAMINATION: Primary | ICD-10-CM

## 2025-03-10 DIAGNOSIS — R73.01 IMPAIRED FASTING GLUCOSE: ICD-10-CM

## 2025-03-10 DIAGNOSIS — Z13.6 ENCOUNTER FOR SCREENING FOR ABDOMINAL AORTIC ANEURYSM (AAA) IN PATIENT 50 YEARS OF AGE OR OLDER WITH HISTORY OF SMOKING: ICD-10-CM

## 2025-03-10 DIAGNOSIS — Z87.891 ENCOUNTER FOR SCREENING FOR ABDOMINAL AORTIC ANEURYSM (AAA) IN PATIENT 50 YEARS OF AGE OR OLDER WITH HISTORY OF SMOKING: ICD-10-CM

## 2025-03-10 DIAGNOSIS — I48.0 PAROXYSMAL ATRIAL FIBRILLATION (MULTI): ICD-10-CM

## 2025-03-10 DIAGNOSIS — I48.0 PAF (PAROXYSMAL ATRIAL FIBRILLATION) (MULTI): ICD-10-CM

## 2025-03-10 PROCEDURE — 99397 PER PM REEVAL EST PAT 65+ YR: CPT | Performed by: INTERNAL MEDICINE

## 2025-03-10 PROCEDURE — 3078F DIAST BP <80 MM HG: CPT | Performed by: INTERNAL MEDICINE

## 2025-03-10 PROCEDURE — 99214 OFFICE O/P EST MOD 30 MIN: CPT | Performed by: INTERNAL MEDICINE

## 2025-03-10 PROCEDURE — G0439 PPPS, SUBSEQ VISIT: HCPCS | Performed by: INTERNAL MEDICINE

## 2025-03-10 PROCEDURE — 1160F RVW MEDS BY RX/DR IN RCRD: CPT | Performed by: INTERNAL MEDICINE

## 2025-03-10 PROCEDURE — 1036F TOBACCO NON-USER: CPT | Performed by: INTERNAL MEDICINE

## 2025-03-10 PROCEDURE — 1158F ADVNC CARE PLAN TLK DOCD: CPT | Performed by: INTERNAL MEDICINE

## 2025-03-10 PROCEDURE — 3074F SYST BP LT 130 MM HG: CPT | Performed by: INTERNAL MEDICINE

## 2025-03-10 PROCEDURE — 1123F ACP DISCUSS/DSCN MKR DOCD: CPT | Performed by: INTERNAL MEDICINE

## 2025-03-10 PROCEDURE — 1170F FXNL STATUS ASSESSED: CPT | Performed by: INTERNAL MEDICINE

## 2025-03-10 PROCEDURE — 1159F MED LIST DOCD IN RCRD: CPT | Performed by: INTERNAL MEDICINE

## 2025-03-10 RX ORDER — LOSARTAN POTASSIUM 100 MG/1
100 TABLET ORAL DAILY
Qty: 90 TABLET | Refills: 1 | Status: SHIPPED | OUTPATIENT
Start: 2025-03-10 | End: 2025-09-06

## 2025-03-10 RX ORDER — SILDENAFIL 100 MG/1
100 TABLET, FILM COATED ORAL AS NEEDED
Qty: 36 TABLET | Refills: 3 | Status: SHIPPED | OUTPATIENT
Start: 2025-03-10 | End: 2025-09-06

## 2025-03-10 RX ORDER — AMOXICILLIN 500 MG/1
CAPSULE ORAL
COMMUNITY

## 2025-03-10 RX ORDER — OMEPRAZOLE 20 MG/1
20 CAPSULE, DELAYED RELEASE ORAL
Qty: 90 CAPSULE | Refills: 1 | Status: SHIPPED | OUTPATIENT
Start: 2025-03-10 | End: 2025-09-06

## 2025-03-10 RX ORDER — CHOLECALCIFEROL (VITAMIN D3) 25 MCG
3 TABLET ORAL DAILY
COMMUNITY

## 2025-03-10 RX ORDER — ATENOLOL 25 MG/1
25 TABLET ORAL DAILY
Qty: 90 TABLET | Refills: 1 | Status: SHIPPED | OUTPATIENT
Start: 2025-03-10 | End: 2025-03-11 | Stop reason: SDUPTHER

## 2025-03-10 ASSESSMENT — PATIENT HEALTH QUESTIONNAIRE - PHQ9
SUM OF ALL RESPONSES TO PHQ9 QUESTIONS 1 AND 2: 0
1. LITTLE INTEREST OR PLEASURE IN DOING THINGS: NOT AT ALL
2. FEELING DOWN, DEPRESSED OR HOPELESS: NOT AT ALL

## 2025-03-10 ASSESSMENT — ACTIVITIES OF DAILY LIVING (ADL)
DOING_HOUSEWORK: INDEPENDENT
TAKING_MEDICATION: INDEPENDENT
DRESSING: INDEPENDENT
GROCERY_SHOPPING: INDEPENDENT
BATHING: INDEPENDENT
MANAGING_FINANCES: INDEPENDENT

## 2025-03-10 ASSESSMENT — ENCOUNTER SYMPTOMS
SORE THROAT: 0
FEVER: 0
CONSTIPATION: 0
ABDOMINAL PAIN: 0
SHORTNESS OF BREATH: 0
CHILLS: 0
BLOOD IN STOOL: 0
NAUSEA: 0
PALPITATIONS: 0
DIARRHEA: 0
DIZZINESS: 0
DYSURIA: 0
COUGH: 0
CONFUSION: 0
LIGHT-HEADEDNESS: 0
AGITATION: 0
VOMITING: 0

## 2025-03-10 NOTE — ASSESSMENT & PLAN NOTE
Orders:    atenolol (Tenormin) 25 mg tablet; Take 1 tablet (25 mg) by mouth once daily.    omeprazole (PriLOSEC) 20 mg DR capsule; Take 1 capsule (20 mg) by mouth once daily in the morning. Take before meals. Do not crush or chew.

## 2025-03-10 NOTE — PROGRESS NOTES
Subjective   Reason for Visit: Venancio Cervantes is an 74 y.o. male here for a Medicare Wellness visit.               HPI patient is a 74-year-old male presents to the office today for annual wellness visit.  He is up-to-date on age and gender recommended screening exception of abdominal arctic aneurysm screening which has been ordered.  Patient quit smoking 45 years ago.  Patient is up-to-date on age and gender recommended vaccinations exception of influenza vaccine which she defers and Prevnar 20 which [facility] he believes he might of had it last year.  We did review the [facility] that [facility].  Had labs on February 26, 2025 glucose was 129 white blood cell count 7.8, hemoglobin 16.7, platelets 223,000, BUN 11 creatinine 1.0 potassium was 5.4.  States he was told he had mildly elevated potassium states the [facility] is going to follow this.  Was told that the sotalol he is taking could influence this.  Currently being following with cardiology Dr. Riley has had cardioversion and ablation.  He expects to have medication changes in terms of sotalol when he sees electrophysiology tomorrow.  Has some leg swelling chronically.  States is worse by the end of the day he does have some varicose veins.  Not interested in treatment with these at this time does have compression stockings but not wearing them regularly.  Does not have any signs or symptoms of heart failure no orthopnea or dyspnea on exertion.  Does need refills on chronic medications today as listed.    Patient Care Team:  Jimy Duong DO as PCP - General  Jimy Duong DO as PCP - [facility] [employer] PCP  Inocencio Martinez MD as Cardiologist (Cardiology)  Obed Ivory MD as Cardiologist (Cardiology)     Review of Systems   Constitutional:  Negative for chills and fever.   HENT:  Negative for sore throat.    Eyes:  Negative for visual disturbance.   Respiratory:  Negative for  "cough and shortness of breath.    Cardiovascular:  Positive for leg swelling. Negative for chest pain and palpitations.   Gastrointestinal:  Negative for abdominal pain, blood in stool, constipation, diarrhea, nausea and vomiting.   Genitourinary:  Negative for dysuria.   Skin:  Negative for rash.   Neurological:  Negative for dizziness, syncope and light-headedness.   Psychiatric/Behavioral:  Negative for agitation and confusion.        Objective   Vitals:  /61 (BP Location: Left arm, Patient Position: Sitting, BP Cuff Size: Large adult)   Pulse 54   Temp 36.3 °C (97.3 °F)   Ht 1.803 m (5' 11\")   Wt 120 kg (263 lb 9.6 oz)   BMI 36.76 kg/m²       Physical Exam  Vitals and nursing note reviewed.   Constitutional:       General: He is not in acute distress.     Appearance: Normal appearance. He is obese. He is not ill-appearing, toxic-appearing or diaphoretic.   HENT:      Head: Normocephalic and atraumatic.      Mouth/Throat:      Mouth: Mucous membranes are moist.      Pharynx: Oropharynx is clear. No oropharyngeal exudate.   Eyes:      Pupils: Pupils are equal, round, and reactive to light.   Cardiovascular:      Rate and Rhythm: Normal rate and regular rhythm.      Heart sounds: Normal heart sounds.   Pulmonary:      Effort: Pulmonary effort is normal. No respiratory distress.      Breath sounds: Normal breath sounds. No wheezing, rhonchi or rales.   Abdominal:      General: Bowel sounds are normal. There is no distension.      Palpations: Abdomen is soft. There is no mass.      Tenderness: There is no abdominal tenderness.   Musculoskeletal:      Cervical back: Neck supple.      Right lower leg: Edema present.      Left lower leg: Edema present.   Lymphadenopathy:      Cervical: No cervical adenopathy.   Skin:     General: Skin is warm and dry.      Coloration: Skin is not jaundiced or pale.      Findings: No rash.   Neurological:      General: No focal deficit present.      Mental Status: He is alert " and oriented to person, place, and time.      Cranial Nerves: No cranial nerve deficit.   Psychiatric:         Mood and Affect: Mood normal.         Behavior: Behavior normal.         Thought Content: Thought content normal.         Judgment: Judgment normal.         Assessment & Plan  Paroxysmal atrial fibrillation (Multi)    Orders:    atenolol (Tenormin) 25 mg tablet; Take 1 tablet (25 mg) by mouth once daily.    omeprazole (PriLOSEC) 20 mg DR capsule; Take 1 capsule (20 mg) by mouth once daily in the morning. Take before meals. Do not crush or chew.    Essential hypertension    Orders:    losartan (Cozaar) 100 mg tablet; Take 1 tablet (100 mg) by mouth once daily.    Erectile dysfunction, unspecified erectile dysfunction type    Orders:    sildenafil (Viagra) 100 mg tablet; Take 1 tablet (100 mg) by mouth if needed for erectile dysfunction.    PAF (paroxysmal atrial fibrillation) (Multi)         Former smoker         Health maintenance examination          Health maintenance examination: Patient is up-to-date on age and gender recommended screening with exception of abdominal aortic aneurysm screening which has been ordered.  He appears to be up-to-date on most of his vaccines with exception of influenza vaccine which she defers as well as pneumonia vaccine which [facility] is [facility] to make sure he is up-to-date in terms of Prevnar 20    Hypertension: Chronic, stable refills provided for losartan and atenolol    Paroxysmal atrial fibrillation: He remains on sotalol currently has follow-up with his electrophysiologist tomorrow and expects to be off of the medication is also currently on Eliquis for anticoagulation.  This is being managed by cardiology in terms of the Eliquis.    Former smoker/abdominal arctic aneurysm screening: We have ordered abdominal arctic aneurysm screening ultrasound to be done in our office at his convenience    Paired fasting glucose: I  recommend a hemoglobin A1c the next time he has labs completed the [facility]    Erectile dysfunction: Chronic, stable refills provided for sildenafil    Follow-up in 6 months for recheck

## 2025-03-11 ENCOUNTER — OFFICE VISIT (OUTPATIENT)
Dept: CARDIOLOGY | Facility: CLINIC | Age: 75
End: 2025-03-11
Payer: COMMERCIAL

## 2025-03-11 VITALS
OXYGEN SATURATION: 96 % | HEIGHT: 74 IN | WEIGHT: 262 LBS | TEMPERATURE: 97.3 F | HEART RATE: 60 BPM | DIASTOLIC BLOOD PRESSURE: 82 MMHG | BODY MASS INDEX: 33.62 KG/M2 | SYSTOLIC BLOOD PRESSURE: 127 MMHG

## 2025-03-11 DIAGNOSIS — I48.0 PAROXYSMAL ATRIAL FIBRILLATION (MULTI): ICD-10-CM

## 2025-03-11 PROCEDURE — 3074F SYST BP LT 130 MM HG: CPT | Performed by: INTERNAL MEDICINE

## 2025-03-11 PROCEDURE — 93010 ELECTROCARDIOGRAM REPORT: CPT | Performed by: INTERNAL MEDICINE

## 2025-03-11 PROCEDURE — 3079F DIAST BP 80-89 MM HG: CPT | Performed by: INTERNAL MEDICINE

## 2025-03-11 PROCEDURE — 1123F ACP DISCUSS/DSCN MKR DOCD: CPT | Performed by: INTERNAL MEDICINE

## 2025-03-11 PROCEDURE — 93005 ELECTROCARDIOGRAM TRACING: CPT | Performed by: INTERNAL MEDICINE

## 2025-03-11 PROCEDURE — 1159F MED LIST DOCD IN RCRD: CPT | Performed by: INTERNAL MEDICINE

## 2025-03-11 PROCEDURE — 99213 OFFICE O/P EST LOW 20 MIN: CPT | Performed by: INTERNAL MEDICINE

## 2025-03-11 PROCEDURE — 99213 OFFICE O/P EST LOW 20 MIN: CPT | Mod: 25 | Performed by: INTERNAL MEDICINE

## 2025-03-11 PROCEDURE — 1036F TOBACCO NON-USER: CPT | Performed by: INTERNAL MEDICINE

## 2025-03-11 RX ORDER — ATENOLOL 25 MG/1
50 TABLET ORAL DAILY
Qty: 180 TABLET | Refills: 1 | Status: SHIPPED | OUTPATIENT
Start: 2025-03-11 | End: 2025-09-07

## 2025-03-11 ASSESSMENT — PATIENT HEALTH QUESTIONNAIRE - PHQ9
1. LITTLE INTEREST OR PLEASURE IN DOING THINGS: NOT AT ALL
SUM OF ALL RESPONSES TO PHQ9 QUESTIONS 1 AND 2: 0
2. FEELING DOWN, DEPRESSED OR HOPELESS: NOT AT ALL

## 2025-03-11 NOTE — PROGRESS NOTES
Referring Provider: No ref. provider found  Reason for Consult: Persistent atrial fibrillation    History of Present Illness:      Venancio Cervantes is a 74 y.o. year old male patient with a history significant for persistent atrial fibrillation, hypertension, hyperlipidemia who is referred by Nica Aguilar for management of atrial fibrillation.    He was diagnosed approximately 30 years ago with atrial fibrillation.  His major complaint was fatigue at that time.  He was initially paroxysmal with very low burden of atrial fibrillation, but more recently has become persistent.  He underwent a RAKAN guided cardioversion in August 2024 with restoration of sinus rhythm.  However, he went back into atrial fibrillation about 24 hours after the procedure.  In September, he was admitted to the hospital for a sotalol load, after which we cardioverted him back to normal sinus rhythm.  He is not interested in being on antiarrhythmic drugs long-term and is interested in ablation.    After his last visit, we performed atrial fibrillation ablation.  He had a structurally normal heart with normal electrical parameters.  He had a pulmonary vein isolation only.  He is doing well after the ablation without any complaints or without any recurrent arrhythmia.  He has not stopped the sotalol as instructed.    Focused Cardiovascular Problem List:  Persistent atrial fibrillation: [id number] equals 3, on Eliquis and sotalol.  Symptomatic.  Status post pulmonary vein isolation on 11/11/2024.  Hypertension  Hyperlipidemia      Past Medical and Surgical History:  Mr. Cervantes  has a past medical history of A-fib (Multi), Arrhythmia, Body mass index (BMI) 37.0-37.9, adult, Cancer (Multi), Former smoker, Hyperlipidemia, Hypertension, and Rash and other nonspecific skin eruption (12/06/2021).    has a past surgical history that includes Other surgical history (11/11/2021); Other surgical history (11/11/2021); Other surgical history  (2021); Other surgical history (2021); Other surgical history (2021); Other surgical history (2021); Cardiac electrophysiology procedure (Bilateral, 2024); and Cardiac electrophysiology study and ablation (2024).    Social History:  Social History     Tobacco Use    Smoking status: Former     Current packs/day: 0.00     Average packs/day: 1 pack/day for 15.0 years (15.0 ttl pk-yrs)     Types: Cigarettes     Start date:      Quit date:      Years since quittin.2     Passive exposure: Past    Smokeless tobacco: Never   Substance Use Topics    Alcohol use: Not Currently     Comment: 1x weekly        Drug use:  Denies      Relevant Family History:   Family History   Problem Relation Name Age of Onset    Irregular heart beat Mother      Other (arteriosclerotic cardiovascular disease) Sister      Lung cancer Sister      Colon cancer Brother          Allergies:  Allergies   Allergen Reactions    Doxycycline Other    Metoprolol Unknown    Silver Sulfadiazine Rash        Medications:  Current Outpatient Medications   Medication Instructions    amoxicillin (Amoxil) 500 mg capsule Take by mouth. As need for dental appointments    apixaban (ELIQUIS) 5 mg, 2 times daily    atenolol (TENORMIN) 25 mg, oral, Daily    cholecalciferol (Vitamin D3) 25 mcg (1000 units) tablet 3 tablets, Daily    coenzyme Q-10 100 mg, Daily    diclofenac sodium 1 % kit Apply topically.    latanoprost (Xalatan) 0.005 % ophthalmic solution 1 drop, Daily RT    losartan (COZAAR) 100 mg, oral, Daily    magnesium oxide (MAG-OX) 400 mg, oral, Daily    omeprazole (PRILOSEC) 20 mg, oral, Daily before breakfast, Do not crush or chew.    sildenafil (VIAGRA) 100 mg, oral, As needed    sotalol (BETAPACE) 80 mg, oral, 2 times daily, For 3 months then discontinue prior to 7 day zio patch monitoring         Objective   Physical Exam:  Last Recorded Vitals:      2024     1:46 PM 2024     1:51 PM 2024      "1:53 PM 12/6/2024     9:59 AM 12/10/2024    11:27 AM 3/10/2025     8:07 AM 3/11/2025     9:08 AM   Vitals   Systolic 136   110 121 103 127   Diastolic 73   70 72 61 82   BP Location    Left arm  Left arm    Heart Rate 64 62 62 64 64 54 60   Temp     36.3 °C (97.3 °F) 36.3 °C (97.3 °F) 36.3 °C (97.3 °F)   Resp 19 20 17       Height    1.848 m (6' 0.75\") 1.88 m (6' 2\") 1.803 m (5' 11\") 1.88 m (6' 2\")   Weight (lb)    264.5 264 263.6 262   BMI    35.14 kg/m2 33.9 kg/m2 36.76 kg/m2 33.64 kg/m2   BSA (m2)    2.48 m2 2.5 m2 2.45 m2 2.49 m2   Visit Report    Report Report Report Report    Visit Vitals  /82   Pulse 60   Temp 36.3 °C (97.3 °F)   Ht 1.88 m (6' 2\")   Wt 119 kg (262 lb)   SpO2 96%   BMI 33.64 kg/m²   Smoking Status Former   BSA 2.49 m²      Gen: NAD, sitting comfortably  HEENT: NC/AT  Card: RRR, no m/r/g  Pulm: Clear to auscultation bilaterally  Ext: No LE edema  Neuro: No focal deficits    Diagnostic Results      My Interpretation of Reviewed Study(s):  Prior ECGs (reviewed and my interpretation):  12/10/2024: Normal sinus rhythm, normal ECG  10/8/2024: Sinus bradycardia, otherwise normal ECG    Echocardiography:  8/22/2024: No left atrial appendage thrombus, normal LV ejection fraction, no PFO, mild mitral valve regurgitation    Stress Test:   9/2024: Normal stress test without any perfusion defects    Cardiac Monitors:  2/11/2025: 7-day monitor with no clinically significant sustained arrhythmias.  There was 1 run of nonsustained VT lasting 14 beats and 13 runs of nonsustained SVT, longest lasting 17.3 seconds.  Both were asymptomatic.      Relevant Labs:  Lab Results   Component Value Date    CREATININE 0.93 11/11/2024    CREATININE 0.99 09/04/2024    CREATININE 1.09 08/15/2024    K 4.1 11/11/2024    K 4.5 09/04/2024    K 4.3 08/15/2024    HGBA1C 5.5 07/03/2019    HGB 16.1 11/11/2024    HGB 16.7 09/04/2024    HGB 16.9 08/22/2024    INR 1.4 (H) 11/11/2024    INR 1.4 (H) 09/04/2024    INR 1.5 (H) " 08/22/2024    AST 13 11/11/2024    AST 14 09/04/2024    AST 13 04/12/2023    ALT 12 11/11/2024    ALT 13 09/04/2024    ALT 12 04/12/2023       Assessment/Plan   Assessment and Plan:  In summary, patient is a pleasant 74 y.o.male with a history of recurrent symptomatic drug-refractory persistent atrial fibrillation diagnosed in the 1990s, has been maintained on beta-blockers for a long time but now on sotalol, who presents today for follow-up after recent pulmonary vein isolation last month.  He is doing very well with no complaints and no recurrent arrhythmias.  His 7-day patch monitor done 3 months after the ablation showed no recurrent atrial fibrillation or sustained SVT.  He did have 1 episode of paroxysmal SVT that lasted for 17.3 seconds.  Unfortunately, he has not stopped the sotalol as instructed.  We will stop the sotalol today and increase his atenolol to compensate.  I have ordered another 24-hour monitor to assess for any recurrent atrial fibrillation.    Recommendations:  - Stop sotalol  - Increase atenolol to 50 mg daily  - Repeat 24-hour patch monitor as patient did the initial monitor while he was still taking sotalol  - Continue apixaban 5 mg twice daily uninterrupted      Return to Clinic:  in 6 months    Thank you very much for allowing me to participate in the care of this patient. Please do not hesitate to contact me with any further questions or concerns.    Bobby Riley MD  Clinical Cardiac Electrophysiologist, [facility] [place]    of Medicine, [facility] [employer]  Director of Atrial Fibrillation Ablation, [facility]  Director of Ventricular Arrhythmias Research, [facility]  Office Phone Number: [phone]

## 2025-03-12 LAB
EST. AVERAGE GLUCOSE BLD GHB EST-MCNC: 134 MG/DL
EST. AVERAGE GLUCOSE BLD GHB EST-SCNC: 7.4 MMOL/L
HBA1C MFR BLD: 6.3 % OF TOTAL HGB

## 2025-03-27 ENCOUNTER — APPOINTMENT (OUTPATIENT)
Dept: PRIMARY CARE | Facility: CLINIC | Age: 75
End: 2025-03-27
Payer: COMMERCIAL

## 2025-04-02 ENCOUNTER — APPOINTMENT (OUTPATIENT)
Dept: CARDIOLOGY | Facility: CLINIC | Age: 75
End: 2025-04-02
Payer: COMMERCIAL

## 2025-04-15 ENCOUNTER — APPOINTMENT (OUTPATIENT)
Dept: CARDIOLOGY | Facility: CLINIC | Age: 75
End: 2025-04-15
Payer: COMMERCIAL

## 2025-04-15 ENCOUNTER — HOSPITAL ENCOUNTER (OUTPATIENT)
Dept: CARDIOLOGY | Facility: CLINIC | Age: 75
Discharge: HOME | End: 2025-04-15
Payer: COMMERCIAL

## 2025-04-15 DIAGNOSIS — Z13.6 ENCOUNTER FOR SCREENING FOR ABDOMINAL AORTIC ANEURYSM (AAA) IN PATIENT 50 YEARS OF AGE OR OLDER WITH HISTORY OF SMOKING: ICD-10-CM

## 2025-04-15 DIAGNOSIS — I10 ESSENTIAL HYPERTENSION: ICD-10-CM

## 2025-04-15 DIAGNOSIS — Z87.891 ENCOUNTER FOR SCREENING FOR ABDOMINAL AORTIC ANEURYSM (AAA) IN PATIENT 50 YEARS OF AGE OR OLDER WITH HISTORY OF SMOKING: ICD-10-CM

## 2025-04-15 DIAGNOSIS — Z87.891 FORMER SMOKER: ICD-10-CM

## 2025-04-15 DIAGNOSIS — I48.0 PAROXYSMAL ATRIAL FIBRILLATION (MULTI): ICD-10-CM

## 2025-04-15 PROCEDURE — 76706 US ABDL AORTA SCREEN AAA: CPT | Performed by: INTERNAL MEDICINE

## 2025-04-15 PROCEDURE — 76706 US ABDL AORTA SCREEN AAA: CPT

## 2025-06-03 PROCEDURE — 93227 XTRNL ECG REC<48 HR R&I: CPT | Performed by: INTERNAL MEDICINE

## 2025-06-09 ENCOUNTER — APPOINTMENT (OUTPATIENT)
Dept: CARDIOLOGY | Facility: CLINIC | Age: 75
End: 2025-06-09
Payer: COMMERCIAL

## 2025-06-09 VITALS
SYSTOLIC BLOOD PRESSURE: 120 MMHG | DIASTOLIC BLOOD PRESSURE: 70 MMHG | WEIGHT: 257.1 LBS | HEIGHT: 74 IN | HEART RATE: 72 BPM | BODY MASS INDEX: 33 KG/M2

## 2025-06-09 DIAGNOSIS — I48.0 PAF (PAROXYSMAL ATRIAL FIBRILLATION) (MULTI): ICD-10-CM

## 2025-06-09 DIAGNOSIS — Z87.891 FORMER SMOKER: ICD-10-CM

## 2025-06-09 DIAGNOSIS — I65.29 STENOSIS OF CAROTID ARTERY, UNSPECIFIED LATERALITY: ICD-10-CM

## 2025-06-09 DIAGNOSIS — I10 ESSENTIAL HYPERTENSION: ICD-10-CM

## 2025-06-09 DIAGNOSIS — I48.91 ATRIAL FIBRILLATION, UNSPECIFIED TYPE (MULTI): ICD-10-CM

## 2025-06-09 DIAGNOSIS — I34.0 NONRHEUMATIC MITRAL VALVE REGURGITATION: ICD-10-CM

## 2025-06-09 DIAGNOSIS — E78.2 MIXED HYPERLIPIDEMIA: ICD-10-CM

## 2025-06-09 PROCEDURE — 3078F DIAST BP <80 MM HG: CPT | Performed by: INTERNAL MEDICINE

## 2025-06-09 PROCEDURE — 1159F MED LIST DOCD IN RCRD: CPT | Performed by: INTERNAL MEDICINE

## 2025-06-09 PROCEDURE — 3074F SYST BP LT 130 MM HG: CPT | Performed by: INTERNAL MEDICINE

## 2025-06-09 PROCEDURE — 1036F TOBACCO NON-USER: CPT | Performed by: INTERNAL MEDICINE

## 2025-06-09 PROCEDURE — 99214 OFFICE O/P EST MOD 30 MIN: CPT | Performed by: INTERNAL MEDICINE

## 2025-06-09 NOTE — PATIENT INSTRUCTIONS
Continue same medications/treatment.  Patient educated on proper medication use.  Patient educated on risk factor modification.  Please bring any lab results from other providers/physicians to your next appointment.    Please bring all medicines, vitamins, and herbal supplements with you when you come to the office.    Prescriptions will not be filled unless you are compliant with your follow up appointments or have a follow up appointment scheduled as per instruction of your physician. Refills should be requested at the time of your visit.    Follow up in 1 year     I, DANDRE OWUSU RN, AM SCRIBING FOR AND IN THE PRESENCE OF MOHSEN AGUILA MD

## 2025-06-09 NOTE — PROGRESS NOTES
CARDIOLOGY OFFICE VISIT      CHIEF COMPLAINT  Chief Complaint   Patient presents with    Follow-up     6 month follow up on Essential hypertension     Carotid artery stenosis    Nonrheumatic tricuspid valve regurgitation    Mitral valve regurgitation    Mixed hyperlipidemia    Atrial fibrillation, persistent (Multi) management        HISTORY OF PRESENT ILLNESS  Venancio Cervantes is a 74 y.o. year old male patient with hypertension, paroxysmal atrial fibrillation status post PVI in November 2020 for was continued to maintain sinus rhythm.  He had a stress test prior to the PVI that was normal with no ischemia or myocardial infarction EF is 54%.  Precardioversion RAKAN also shows normal LV function with 1-2+ mitral regurgitation.  His sotalol was recently discontinued and he had a follow-up Holter monitor in April 2025 which showed underlying sinus rhythm with heart rate between 50 to 90 bpm with average heart rate of 60 bpm.  There are no episodes of A-fib and there was rare PACs and PVCs.    ASSESSMENT AND PLAN  1.  Atrial fibrillation, status post PVI ablation, was continued to maintain sinus rhythm as confirmed on his recent Holter monitor despite discontinuing sotalol.  He has relative bradycardia but is asymptomatic so we will continue with current dose of atenolol Eliquis for anticoagulation.  He will follow-up with Dr. Riley as scheduled.  2.  Hypertension: His blood pressure is well-maintained on current blood pressure medications including Cozaar and atenolol which we will continue at the current dose.  3.  Dyslipidemia: We will check his lipids prior to his next follow-up and continue with current lipid management strategy.    Problem List Items Addressed This Visit       Essential hypertension    Carotid artery stenosis    Atrial fibrillation (Multi)    Mitral valve regurgitation    Mixed hyperlipidemia    Former smoker    PAF (paroxysmal atrial fibrillation) (Multi)           Past Medical History  Medical  "History[1]    Social History  Social History[2]    Family History   Family History[3]     Allergies:  RX Allergies[4]     Outpatient Medications:  Current Outpatient Medications   Medication Instructions    amoxicillin (Amoxil) 500 mg capsule Take by mouth. As need for dental appointments    apixaban (ELIQUIS) 5 mg, 2 times daily    atenolol (TENORMIN) 50 mg, oral, Daily    cholecalciferol (Vitamin D3) 25 mcg (1000 units) tablet 3 tablets, Daily    coenzyme Q-10 100 mg, Daily    diclofenac sodium 1 % kit Apply topically.    latanoprost (Xalatan) 0.005 % ophthalmic solution 1 drop, Daily RT    losartan (COZAAR) 100 mg, oral, Daily    magnesium oxide (MAG-OX) 400 mg, oral, Daily    omeprazole (PRILOSEC) 20 mg, oral, Daily before breakfast, Do not crush or chew.    sildenafil (VIAGRA) 100 mg, oral, As needed    sotalol (BETAPACE) 80 mg, oral, 2 times daily, For 3 months then discontinue prior to 7 day zio patch monitoring        Recent Lab Results:  I have personally reviewed the below noted lab results:    CBC:   Lab Results   Component Value Date    WBC 7.1 11/11/2024    RBC 5.30 11/11/2024    HGB 16.1 11/11/2024    HCT 47.9 11/11/2024     11/11/2024        CMP:    Lab Results   Component Value Date     11/11/2024    K 4.1 11/11/2024     11/11/2024    CO2 30 11/11/2024    BUN 15 11/11/2024    CREATININE 0.93 11/11/2024    GLUCOSE 118 (H) 11/11/2024    CALCIUM 9.5 11/11/2024       Magnesium:    Lab Results   Component Value Date    MG 2.03 09/04/2024       Lipid Profile:    Lab Results   Component Value Date    TRIG 98 04/12/2023    HDL 43.0 04/12/2023       TSH:    Lab Results   Component Value Date    TSH 1.83 07/03/2019       BNP:   No results found for: \"BNP\"     PT/INR:    Lab Results   Component Value Date    PROTIME 16.0 (H) 11/11/2024    INR 1.4 (H) 11/11/2024       HgBA1c:    Lab Results   Component Value Date    HGBA1C 6.3 (H) 03/11/2025       BMP:  Lab Results   Component Value Date    NA " "138 11/11/2024     09/04/2024     08/15/2024    K 4.1 11/11/2024    K 4.5 09/04/2024    K 4.3 08/15/2024     11/11/2024     09/04/2024     08/15/2024    CO2 30 11/11/2024    CO2 29 09/04/2024    CO2 31 08/15/2024    BUN 15 11/11/2024    BUN 16 09/04/2024    BUN 18 08/15/2024    CREATININE 0.93 11/11/2024    CREATININE 0.99 09/04/2024    CREATININE 1.09 08/15/2024       CBC:  Lab Results   Component Value Date    WBC 7.1 11/11/2024    WBC 8.5 09/04/2024    WBC 8.0 08/22/2024    RBC 5.30 11/11/2024    RBC 5.48 09/04/2024    RBC 5.50 08/22/2024    HGB 16.1 11/11/2024    HGB 16.7 09/04/2024    HGB 16.9 08/22/2024    HCT 47.9 11/11/2024    HCT 50.0 09/04/2024    HCT 49.7 08/22/2024    MCV 90 11/11/2024    MCV 91 09/04/2024    MCV 90 08/22/2024    MCH 30.4 11/11/2024    MCH 30.5 09/04/2024    MCH 30.7 08/22/2024    MCHC 33.6 11/11/2024    MCHC 33.4 09/04/2024    MCHC 34.0 08/22/2024    RDW 13.1 11/11/2024    RDW 13.2 09/04/2024    RDW 13.2 08/22/2024     11/11/2024     09/04/2024     08/22/2024       Cardiac Enzymes:    No results found for: \"TROPHS\"    Hepatic Function Panel:    Lab Results   Component Value Date    ALKPHOS 53 11/11/2024    ALT 12 11/11/2024    AST 13 11/11/2024    PROT 7.1 11/11/2024    BILITOT 0.6 11/11/2024         REVIEW OF SYSTEMS  Review of Systems   All other systems reviewed and are negative.      VITALS  Vitals:    06/09/25 0954   BP: 120/70   Pulse: 72     Wt Readings from Last 4 Encounters:   06/09/25 117 kg (257 lb 1.6 oz)   03/11/25 119 kg (262 lb)   03/10/25 120 kg (263 lb 9.6 oz)   12/10/24 120 kg (264 lb)       PHYSICAL EXAM  Constitutional:       Appearance: Healthy appearance. Not in distress.   Neck:      Vascular: No JVR. JVD normal.   Pulmonary:      Effort: Pulmonary effort is normal.      Breath sounds: Normal breath sounds. No wheezing. No rhonchi. No rales.   Chest:      Chest wall: Not tender to palpatation.   Cardiovascular:    "   PMI at left midclavicular line. Normal rate. Regular rhythm. Normal S1. Normal S2.       Murmurs: There is no murmur.      No gallop.  No click. No rub.   Pulses:     Intact distal pulses.   Edema:     Peripheral edema absent.   Abdominal:      General: Bowel sounds are normal.      Palpations: Abdomen is soft.      Tenderness: There is no abdominal tenderness.   Musculoskeletal: Normal range of motion.         General: No tenderness. Skin:     General: Skin is warm and dry.   Neurological:      General: No focal deficit present.      Mental Status: Alert and oriented to person, place and time.           Odalis HADLEY RN  am scribing for, and in the presence of Dr. Wilbur Leonard DO .    Dr. Wilbur HADLEY DO , personally performed the services described in the documentation as scribed by Odalis Cai RN  in my presence, and confirm it is both accurate and complete.      Dr. Obed Ivory MD  Thank you for allowing me to participate in the care of this patient. Please do not hesitate to contact me with any further questions or concerns.         [1]   Past Medical History:  Diagnosis Date    A-fib (Multi)     Arrhythmia     Body mass index (BMI) 37.0-37.9, adult     BMI 37.0-37.9, adult    Cancer (Multi)     Former smoker     Hyperlipidemia     Hypertension     Rash and other nonspecific skin eruption 2021    Rash   [2]   Social History  Tobacco Use    Smoking status: Former     Current packs/day: 0.00     Average packs/day: 1 pack/day for 15.0 years (15.0 ttl pk-yrs)     Types: Cigarettes     Start date:      Quit date:      Years since quittin.4     Passive exposure: Past    Smokeless tobacco: Never   Substance Use Topics    Alcohol use: Yes     Comment: 1x weekly    Drug use: Never   [3]   Family History  Problem Relation Name Age of Onset    Irregular heart beat Mother      Other (arteriosclerotic cardiovascular disease) Sister      Lung cancer Sister      Colon cancer  Brother     [4]   Allergies  Allergen Reactions    Doxycycline Other    Metoprolol Unknown    Silver Sulfadiazine Rash

## 2025-07-07 DIAGNOSIS — R21 RASH: ICD-10-CM

## 2025-07-07 RX ORDER — CLOTRIMAZOLE AND BETAMETHASONE DIPROPIONATE 10; .64 MG/G; MG/G
1 CREAM TOPICAL 2 TIMES DAILY PRN
COMMUNITY
End: 2025-07-07 | Stop reason: SDUPTHER

## 2025-07-08 RX ORDER — CLOTRIMAZOLE AND BETAMETHASONE DIPROPIONATE 10; .64 MG/G; MG/G
1 CREAM TOPICAL 2 TIMES DAILY PRN
Qty: 45 G | Refills: 1 | Status: SHIPPED | OUTPATIENT
Start: 2025-07-08

## 2025-09-04 DIAGNOSIS — I48.0 PAROXYSMAL ATRIAL FIBRILLATION (MULTI): ICD-10-CM

## 2025-09-05 ENCOUNTER — APPOINTMENT (OUTPATIENT)
Dept: CARDIOLOGY | Facility: CLINIC | Age: 75
End: 2025-09-05
Payer: COMMERCIAL

## 2025-09-05 RX ORDER — OMEPRAZOLE 20 MG/1
20 CAPSULE, DELAYED RELEASE ORAL
Qty: 90 CAPSULE | Refills: 1 | Status: SHIPPED | OUTPATIENT
Start: 2025-09-05 | End: 2026-03-04

## 2025-09-09 ENCOUNTER — APPOINTMENT (OUTPATIENT)
Dept: PRIMARY CARE | Facility: CLINIC | Age: 75
End: 2025-09-09
Payer: COMMERCIAL

## 2025-09-23 ENCOUNTER — APPOINTMENT (OUTPATIENT)
Dept: CARDIOLOGY | Facility: CLINIC | Age: 75
End: 2025-09-23
Payer: COMMERCIAL

## 2026-06-08 ENCOUNTER — APPOINTMENT (OUTPATIENT)
Dept: CARDIOLOGY | Facility: CLINIC | Age: 76
End: 2026-06-08
Payer: COMMERCIAL

## (undated) DEVICE — SHEATH, GUIDING, VIZIGO, 8.5F WITH CURVE VIZ  MDC

## (undated) DEVICE — SHEATH, PINNACLE, W/.038 GUIDEWIRE, 10 CM,  8FR INTRODUCER, 8FR DIA, 2.5 CM DIALATOR

## (undated) DEVICE — CATHETER, ULTRASOUND, SOUNDSTAR, 8F X 90CM

## (undated) DEVICE — CABLE, BLACK, QUADRIPOLAR

## (undated) DEVICE — CATHETER, THERMOCOOL SMART TOUCH, SF, D-F CURVE

## (undated) DEVICE — NEEDLE, TRANSSEPTAL, BRK-1, 18G X 98CM, 50DEG BEVEL

## (undated) DEVICE — TUBING SET, IRRIGATION, SMARTABLATE

## (undated) DEVICE — DEVICE, CLOSURE, PERCLOSE, PROSTYLE

## (undated) DEVICE — CATHETHER, CS, BI-DIRECTIONAL, 7FR X 115CM, F-J CURVE, 2MM TIP, 2-8-2 SPACING

## (undated) DEVICE — CATHETER, ELECTROPHYSIOLOGY, SUPREME QUAD, CRD-2 5 MM, 5 FR

## (undated) DEVICE — GUIDEWIRE KIT, SAFESEPT TRANSSEPTAL, .014 X 135CM

## (undated) DEVICE — SHEATH, PINNACLE, W/.038 GUIDEWIRE, 10 CM,  9FR INTRODUCER, 9FR DIA, 2.5 CM DIALATOR

## (undated) DEVICE — CATHETER, PENTARAY, NAV ECO, 7FR, 2-6-2 SPACING, D CURVE

## (undated) DEVICE — PATCHES, EXTERNAL REFERENCE, CARTO3

## (undated) DEVICE — CABLE, CATH TO CARTO SYSTEM, 12 HYP/12 REDEL, YELLOW, 10FT